# Patient Record
Sex: FEMALE | Race: WHITE | NOT HISPANIC OR LATINO | Employment: OTHER | URBAN - METROPOLITAN AREA
[De-identification: names, ages, dates, MRNs, and addresses within clinical notes are randomized per-mention and may not be internally consistent; named-entity substitution may affect disease eponyms.]

---

## 2017-11-09 ENCOUNTER — HOSPITAL ENCOUNTER (OUTPATIENT)
Dept: RADIOLOGY | Facility: MEDICAL CENTER | Age: 72
End: 2017-11-09
Attending: FAMILY MEDICINE
Payer: MEDICARE

## 2017-11-09 DIAGNOSIS — R22.42 MASS OF LOWER LEG, LEFT: ICD-10-CM

## 2017-11-09 PROCEDURE — 76882 US LMTD JT/FCL EVL NVASC XTR: CPT | Mod: LT

## 2018-02-23 ENCOUNTER — HOSPITAL ENCOUNTER (OUTPATIENT)
Dept: RADIOLOGY | Facility: MEDICAL CENTER | Age: 73
End: 2018-02-23
Attending: FAMILY MEDICINE
Payer: MEDICARE

## 2018-02-23 DIAGNOSIS — Z12.31 SCREENING MAMMOGRAM, ENCOUNTER FOR: ICD-10-CM

## 2018-02-23 PROCEDURE — 77063 BREAST TOMOSYNTHESIS BI: CPT

## 2018-03-07 ENCOUNTER — HOSPITAL ENCOUNTER (OUTPATIENT)
Dept: RADIOLOGY | Facility: MEDICAL CENTER | Age: 73
End: 2018-03-07
Attending: FAMILY MEDICINE
Payer: MEDICARE

## 2018-03-07 DIAGNOSIS — M85.9 DISORDER OF BONE DENSITY AND STRUCTURE, UNSPECIFIED: ICD-10-CM

## 2018-03-07 DIAGNOSIS — Z13.820 SCREENING FOR OSTEOPOROSIS: ICD-10-CM

## 2018-03-07 PROCEDURE — 77080 DXA BONE DENSITY AXIAL: CPT

## 2018-03-08 ENCOUNTER — HOSPITAL ENCOUNTER (OUTPATIENT)
Dept: LAB | Facility: MEDICAL CENTER | Age: 73
End: 2018-03-08
Attending: FAMILY MEDICINE
Payer: MEDICARE

## 2018-03-08 LAB
ALBUMIN SERPL BCP-MCNC: 4.2 G/DL (ref 3.2–4.9)
ALBUMIN/GLOB SERPL: 1.5 G/DL
ALP SERPL-CCNC: 52 U/L (ref 30–99)
ALT SERPL-CCNC: 25 U/L (ref 2–50)
ANION GAP SERPL CALC-SCNC: 6 MMOL/L (ref 0–11.9)
AST SERPL-CCNC: 24 U/L (ref 12–45)
BASOPHILS # BLD AUTO: 0.6 % (ref 0–1.8)
BASOPHILS # BLD: 0.03 K/UL (ref 0–0.12)
BILIRUB SERPL-MCNC: 0.5 MG/DL (ref 0.1–1.5)
BUN SERPL-MCNC: 17 MG/DL (ref 8–22)
CALCIUM SERPL-MCNC: 9.3 MG/DL (ref 8.5–10.5)
CHLORIDE SERPL-SCNC: 106 MMOL/L (ref 96–112)
CHOLEST SERPL-MCNC: 186 MG/DL (ref 100–199)
CO2 SERPL-SCNC: 28 MMOL/L (ref 20–33)
CREAT SERPL-MCNC: 0.76 MG/DL (ref 0.5–1.4)
EOSINOPHIL # BLD AUTO: 0.07 K/UL (ref 0–0.51)
EOSINOPHIL NFR BLD: 1.5 % (ref 0–6.9)
ERYTHROCYTE [DISTWIDTH] IN BLOOD BY AUTOMATED COUNT: 46.8 FL (ref 35.9–50)
GLOBULIN SER CALC-MCNC: 2.8 G/DL (ref 1.9–3.5)
GLUCOSE SERPL-MCNC: 91 MG/DL (ref 65–99)
HCT VFR BLD AUTO: 43.5 % (ref 37–47)
HDLC SERPL-MCNC: 66 MG/DL
HGB BLD-MCNC: 14.1 G/DL (ref 12–16)
IMM GRANULOCYTES # BLD AUTO: 0.01 K/UL (ref 0–0.11)
IMM GRANULOCYTES NFR BLD AUTO: 0.2 % (ref 0–0.9)
LDLC SERPL CALC-MCNC: 101 MG/DL
LYMPHOCYTES # BLD AUTO: 1.59 K/UL (ref 1–4.8)
LYMPHOCYTES NFR BLD: 33.7 % (ref 22–41)
MCH RBC QN AUTO: 32.4 PG (ref 27–33)
MCHC RBC AUTO-ENTMCNC: 32.4 G/DL (ref 33.6–35)
MCV RBC AUTO: 100 FL (ref 81.4–97.8)
MONOCYTES # BLD AUTO: 0.52 K/UL (ref 0–0.85)
MONOCYTES NFR BLD AUTO: 11 % (ref 0–13.4)
NEUTROPHILS # BLD AUTO: 2.5 K/UL (ref 2–7.15)
NEUTROPHILS NFR BLD: 53 % (ref 44–72)
NRBC # BLD AUTO: 0 K/UL
NRBC BLD-RTO: 0 /100 WBC
PLATELET # BLD AUTO: 266 K/UL (ref 164–446)
PMV BLD AUTO: 10.7 FL (ref 9–12.9)
POTASSIUM SERPL-SCNC: 4.4 MMOL/L (ref 3.6–5.5)
PROT SERPL-MCNC: 7 G/DL (ref 6–8.2)
RBC # BLD AUTO: 4.35 M/UL (ref 4.2–5.4)
SODIUM SERPL-SCNC: 140 MMOL/L (ref 135–145)
T4 SERPL-MCNC: 6.6 UG/DL (ref 4–12)
THYROPEROXIDASE AB SERPL-ACNC: 158 IU/ML (ref 0–9)
TRIGL SERPL-MCNC: 96 MG/DL (ref 0–149)
TSH SERPL DL<=0.005 MIU/L-ACNC: 1.29 UIU/ML (ref 0.38–5.33)
WBC # BLD AUTO: 4.7 K/UL (ref 4.8–10.8)

## 2018-03-08 PROCEDURE — 80053 COMPREHEN METABOLIC PANEL: CPT

## 2018-03-08 PROCEDURE — 85025 COMPLETE CBC W/AUTO DIFF WBC: CPT

## 2018-03-08 PROCEDURE — 80061 LIPID PANEL: CPT

## 2018-03-08 PROCEDURE — 86376 MICROSOMAL ANTIBODY EACH: CPT

## 2018-03-08 PROCEDURE — 84443 ASSAY THYROID STIM HORMONE: CPT

## 2018-03-08 PROCEDURE — 36415 COLL VENOUS BLD VENIPUNCTURE: CPT

## 2018-03-08 PROCEDURE — 84436 ASSAY OF TOTAL THYROXINE: CPT

## 2018-03-20 ENCOUNTER — HOSPITAL ENCOUNTER (OUTPATIENT)
Dept: RADIOLOGY | Facility: MEDICAL CENTER | Age: 73
End: 2018-03-20

## 2018-04-16 ENCOUNTER — HOSPITAL ENCOUNTER (OUTPATIENT)
Dept: RADIOLOGY | Facility: MEDICAL CENTER | Age: 73
End: 2018-04-16
Attending: FAMILY MEDICINE
Payer: MEDICARE

## 2018-04-16 DIAGNOSIS — N63.15 BREAST LUMP ON RIGHT SIDE AT 3 O'CLOCK POSITION: ICD-10-CM

## 2018-04-16 PROCEDURE — 76642 ULTRASOUND BREAST LIMITED: CPT | Mod: RT

## 2018-05-03 ENCOUNTER — HOSPITAL ENCOUNTER (OUTPATIENT)
Dept: RADIOLOGY | Facility: MEDICAL CENTER | Age: 73
End: 2018-05-03
Attending: SURGERY
Payer: MEDICARE

## 2018-05-03 DIAGNOSIS — R22.9 LOCALIZED SUPERFICIAL SWELLING, MASS, OR LUMP: ICD-10-CM

## 2018-05-03 PROCEDURE — 76882 US LMTD JT/FCL EVL NVASC XTR: CPT | Mod: LT

## 2018-05-03 PROCEDURE — 76881 US COMPL JOINT R-T W/IMG: CPT

## 2019-02-25 ENCOUNTER — HOSPITAL ENCOUNTER (OUTPATIENT)
Dept: RADIOLOGY | Facility: MEDICAL CENTER | Age: 74
End: 2019-02-25
Attending: FAMILY MEDICINE
Payer: MEDICARE

## 2019-02-25 DIAGNOSIS — Z12.31 VISIT FOR SCREENING MAMMOGRAM: ICD-10-CM

## 2019-02-25 PROCEDURE — 77063 BREAST TOMOSYNTHESIS BI: CPT

## 2019-04-08 ENCOUNTER — HOSPITAL ENCOUNTER (OUTPATIENT)
Dept: LAB | Facility: MEDICAL CENTER | Age: 74
End: 2019-04-08
Attending: FAMILY MEDICINE
Payer: MEDICARE

## 2019-04-08 LAB
ALBUMIN SERPL BCP-MCNC: 4.2 G/DL (ref 3.2–4.9)
ALBUMIN/GLOB SERPL: 1.4 G/DL
ALP SERPL-CCNC: 56 U/L (ref 30–99)
ALT SERPL-CCNC: 19 U/L (ref 2–50)
ANION GAP SERPL CALC-SCNC: 4 MMOL/L (ref 0–11.9)
APPEARANCE UR: CLEAR
AST SERPL-CCNC: 20 U/L (ref 12–45)
BACTERIA #/AREA URNS HPF: NEGATIVE /HPF
BASOPHILS # BLD AUTO: 0.9 % (ref 0–1.8)
BASOPHILS # BLD: 0.05 K/UL (ref 0–0.12)
BILIRUB SERPL-MCNC: 0.4 MG/DL (ref 0.1–1.5)
BILIRUB UR QL STRIP.AUTO: NEGATIVE
BUN SERPL-MCNC: 18 MG/DL (ref 8–22)
CALCIUM SERPL-MCNC: 9.6 MG/DL (ref 8.5–10.5)
CHLORIDE SERPL-SCNC: 105 MMOL/L (ref 96–112)
CHOLEST SERPL-MCNC: 192 MG/DL (ref 100–199)
CO2 SERPL-SCNC: 29 MMOL/L (ref 20–33)
COLOR UR: YELLOW
CREAT SERPL-MCNC: 0.8 MG/DL (ref 0.5–1.4)
EOSINOPHIL # BLD AUTO: 0.1 K/UL (ref 0–0.51)
EOSINOPHIL NFR BLD: 1.9 % (ref 0–6.9)
EPI CELLS #/AREA URNS HPF: ABNORMAL /HPF
ERYTHROCYTE [DISTWIDTH] IN BLOOD BY AUTOMATED COUNT: 48.4 FL (ref 35.9–50)
GLOBULIN SER CALC-MCNC: 2.9 G/DL (ref 1.9–3.5)
GLUCOSE SERPL-MCNC: 106 MG/DL (ref 65–99)
GLUCOSE UR STRIP.AUTO-MCNC: NEGATIVE MG/DL
HCT VFR BLD AUTO: 45.2 % (ref 37–47)
HDLC SERPL-MCNC: 77 MG/DL
HGB BLD-MCNC: 14.7 G/DL (ref 12–16)
HYALINE CASTS #/AREA URNS LPF: ABNORMAL /LPF
IMM GRANULOCYTES # BLD AUTO: 0 K/UL (ref 0–0.11)
IMM GRANULOCYTES NFR BLD AUTO: 0 % (ref 0–0.9)
KETONES UR STRIP.AUTO-MCNC: NEGATIVE MG/DL
LDLC SERPL CALC-MCNC: 103 MG/DL
LEUKOCYTE ESTERASE UR QL STRIP.AUTO: ABNORMAL
LYMPHOCYTES # BLD AUTO: 2.15 K/UL (ref 1–4.8)
LYMPHOCYTES NFR BLD: 40 % (ref 22–41)
MCH RBC QN AUTO: 33.6 PG (ref 27–33)
MCHC RBC AUTO-ENTMCNC: 32.5 G/DL (ref 33.6–35)
MCV RBC AUTO: 103.2 FL (ref 81.4–97.8)
MICRO URNS: ABNORMAL
MONOCYTES # BLD AUTO: 0.58 K/UL (ref 0–0.85)
MONOCYTES NFR BLD AUTO: 10.8 % (ref 0–13.4)
NEUTROPHILS # BLD AUTO: 2.5 K/UL (ref 2–7.15)
NEUTROPHILS NFR BLD: 46.4 % (ref 44–72)
NITRITE UR QL STRIP.AUTO: NEGATIVE
NRBC # BLD AUTO: 0 K/UL
NRBC BLD-RTO: 0 /100 WBC
PH UR STRIP.AUTO: 6.5 [PH]
PLATELET # BLD AUTO: 267 K/UL (ref 164–446)
PMV BLD AUTO: 10.4 FL (ref 9–12.9)
POTASSIUM SERPL-SCNC: 4.1 MMOL/L (ref 3.6–5.5)
PROT SERPL-MCNC: 7.1 G/DL (ref 6–8.2)
PROT UR QL STRIP: NEGATIVE MG/DL
RBC # BLD AUTO: 4.38 M/UL (ref 4.2–5.4)
RBC # URNS HPF: ABNORMAL /HPF
RBC UR QL AUTO: NEGATIVE
SODIUM SERPL-SCNC: 138 MMOL/L (ref 135–145)
SP GR UR STRIP.AUTO: 1.01
T3FREE SERPL-MCNC: 3.43 PG/ML (ref 2.4–4.2)
T4 FREE SERPL-MCNC: 1.13 NG/DL (ref 0.53–1.43)
TRIGL SERPL-MCNC: 62 MG/DL (ref 0–149)
TSH SERPL DL<=0.005 MIU/L-ACNC: 3.29 UIU/ML (ref 0.38–5.33)
UROBILINOGEN UR STRIP.AUTO-MCNC: 0.2 MG/DL
WBC # BLD AUTO: 5.4 K/UL (ref 4.8–10.8)
WBC #/AREA URNS HPF: ABNORMAL /HPF

## 2019-04-08 PROCEDURE — 85025 COMPLETE CBC W/AUTO DIFF WBC: CPT

## 2019-04-08 PROCEDURE — 81001 URINALYSIS AUTO W/SCOPE: CPT

## 2019-04-08 PROCEDURE — 84439 ASSAY OF FREE THYROXINE: CPT

## 2019-04-08 PROCEDURE — 87086 URINE CULTURE/COLONY COUNT: CPT

## 2019-04-08 PROCEDURE — 80061 LIPID PANEL: CPT

## 2019-04-08 PROCEDURE — 84443 ASSAY THYROID STIM HORMONE: CPT

## 2019-04-08 PROCEDURE — 36415 COLL VENOUS BLD VENIPUNCTURE: CPT

## 2019-04-08 PROCEDURE — 80053 COMPREHEN METABOLIC PANEL: CPT

## 2019-04-08 PROCEDURE — 84481 FREE ASSAY (FT-3): CPT

## 2019-04-10 LAB
BACTERIA UR CULT: NORMAL
SIGNIFICANT IND 70042: NORMAL
SITE SITE: NORMAL
SOURCE SOURCE: NORMAL

## 2019-05-15 ENCOUNTER — HOSPITAL ENCOUNTER (OUTPATIENT)
Dept: LAB | Facility: MEDICAL CENTER | Age: 74
End: 2019-05-15
Attending: FAMILY MEDICINE
Payer: MEDICARE

## 2019-05-15 LAB
BASOPHILS # BLD AUTO: 0.7 % (ref 0–1.8)
BASOPHILS # BLD: 0.04 K/UL (ref 0–0.12)
EOSINOPHIL # BLD AUTO: 0.23 K/UL (ref 0–0.51)
EOSINOPHIL NFR BLD: 4.1 % (ref 0–6.9)
ERYTHROCYTE [DISTWIDTH] IN BLOOD BY AUTOMATED COUNT: 45.1 FL (ref 35.9–50)
FOLATE SERPL-MCNC: 17.7 NG/ML
HCT VFR BLD AUTO: 45.1 % (ref 37–47)
HGB BLD-MCNC: 15.1 G/DL (ref 12–16)
HGB RETIC QN AUTO: 36 PG/CELL (ref 29–35)
IMM GRANULOCYTES # BLD AUTO: 0.01 K/UL (ref 0–0.11)
IMM GRANULOCYTES NFR BLD AUTO: 0.2 % (ref 0–0.9)
IMM RETICS NFR: 10.6 % (ref 9.3–17.4)
LYMPHOCYTES # BLD AUTO: 1.74 K/UL (ref 1–4.8)
LYMPHOCYTES NFR BLD: 31.2 % (ref 22–41)
MCH RBC QN AUTO: 33.6 PG (ref 27–33)
MCHC RBC AUTO-ENTMCNC: 33.5 G/DL (ref 33.6–35)
MCV RBC AUTO: 100.4 FL (ref 81.4–97.8)
MONOCYTES # BLD AUTO: 0.56 K/UL (ref 0–0.85)
MONOCYTES NFR BLD AUTO: 10.1 % (ref 0–13.4)
NEUTROPHILS # BLD AUTO: 2.99 K/UL (ref 2–7.15)
NEUTROPHILS NFR BLD: 53.7 % (ref 44–72)
NRBC # BLD AUTO: 0 K/UL
NRBC BLD-RTO: 0 /100 WBC
PLATELET # BLD AUTO: 266 K/UL (ref 164–446)
PMV BLD AUTO: 11 FL (ref 9–12.9)
RBC # BLD AUTO: 4.49 M/UL (ref 4.2–5.4)
RETICS # AUTO: 0.05 M/UL (ref 0.04–0.06)
RETICS/RBC NFR: 1.1 % (ref 0.8–2.1)
VIT B12 SERPL-MCNC: 332 PG/ML (ref 211–911)
WBC # BLD AUTO: 5.6 K/UL (ref 4.8–10.8)

## 2019-05-15 PROCEDURE — 84160 ASSAY OF PROTEIN ANY SOURCE: CPT

## 2019-05-15 PROCEDURE — 85025 COMPLETE CBC W/AUTO DIFF WBC: CPT

## 2019-05-15 PROCEDURE — 82746 ASSAY OF FOLIC ACID SERUM: CPT

## 2019-05-15 PROCEDURE — 85305 CLOT INHIBIT PROT S TOTAL: CPT

## 2019-05-15 PROCEDURE — 82607 VITAMIN B-12: CPT

## 2019-05-15 PROCEDURE — 36415 COLL VENOUS BLD VENIPUNCTURE: CPT

## 2019-05-15 PROCEDURE — 84165 PROTEIN E-PHORESIS SERUM: CPT

## 2019-05-15 PROCEDURE — 85046 RETICYTE/HGB CONCENTRATE: CPT

## 2019-05-17 LAB — PROT S AG ACT/NOR PPP IA: 123 % (ref 63–126)

## 2019-06-01 LAB
ALBUMIN SERPL-MCNC: 4.39 G/DL (ref 3.75–5.01)
ALPHA1 GLOB SERPL ELPH-MCNC: 0.3 G/DL (ref 0.19–0.46)
ALPHA2 GLOB SERPL ELPH-MCNC: 0.77 G/DL (ref 0.48–1.05)
B-GLOBULIN SERPL ELPH-MCNC: 0.84 G/DL (ref 0.48–1.1)
EER PROT ELECT SER Q1092: NORMAL
GAMMA GLOB SERPL ELPH-MCNC: 0.91 G/DL (ref 0.62–1.51)
INTERPRETATION SERPL IFE-IMP: NORMAL
PROT SERPL-MCNC: 7.2 G/DL (ref 6–8.3)

## 2019-09-12 ENCOUNTER — HOSPITAL ENCOUNTER (OUTPATIENT)
Dept: LAB | Facility: MEDICAL CENTER | Age: 74
End: 2019-09-12
Attending: FAMILY MEDICINE
Payer: MEDICARE

## 2019-09-12 LAB
ALBUMIN SERPL BCP-MCNC: 4.6 G/DL (ref 3.2–4.9)
ALBUMIN/GLOB SERPL: 1.8 G/DL
ALP SERPL-CCNC: 53 U/L (ref 30–99)
ALT SERPL-CCNC: 20 U/L (ref 2–50)
ANION GAP SERPL CALC-SCNC: 9 MMOL/L (ref 0–11.9)
AST SERPL-CCNC: 20 U/L (ref 12–45)
BASOPHILS # BLD AUTO: 0.6 % (ref 0–1.8)
BASOPHILS # BLD: 0.04 K/UL (ref 0–0.12)
BILIRUB SERPL-MCNC: 0.4 MG/DL (ref 0.1–1.5)
BUN SERPL-MCNC: 19 MG/DL (ref 8–22)
CALCIUM SERPL-MCNC: 9.6 MG/DL (ref 8.5–10.5)
CHLORIDE SERPL-SCNC: 106 MMOL/L (ref 96–112)
CO2 SERPL-SCNC: 27 MMOL/L (ref 20–33)
CREAT SERPL-MCNC: 0.92 MG/DL (ref 0.5–1.4)
EOSINOPHIL # BLD AUTO: 0.09 K/UL (ref 0–0.51)
EOSINOPHIL NFR BLD: 1.4 % (ref 0–6.9)
ERYTHROCYTE [DISTWIDTH] IN BLOOD BY AUTOMATED COUNT: 47.3 FL (ref 35.9–50)
GLOBULIN SER CALC-MCNC: 2.6 G/DL (ref 1.9–3.5)
GLUCOSE SERPL-MCNC: 98 MG/DL (ref 65–99)
HCT VFR BLD AUTO: 48.6 % (ref 37–47)
HGB BLD-MCNC: 15.2 G/DL (ref 12–16)
IMM GRANULOCYTES # BLD AUTO: 0.02 K/UL (ref 0–0.11)
IMM GRANULOCYTES NFR BLD AUTO: 0.3 % (ref 0–0.9)
LYMPHOCYTES # BLD AUTO: 1.64 K/UL (ref 1–4.8)
LYMPHOCYTES NFR BLD: 25.6 % (ref 22–41)
MCH RBC QN AUTO: 32.3 PG (ref 27–33)
MCHC RBC AUTO-ENTMCNC: 31.3 G/DL (ref 33.6–35)
MCV RBC AUTO: 103.2 FL (ref 81.4–97.8)
MONOCYTES # BLD AUTO: 0.56 K/UL (ref 0–0.85)
MONOCYTES NFR BLD AUTO: 8.8 % (ref 0–13.4)
NEUTROPHILS # BLD AUTO: 4.05 K/UL (ref 2–7.15)
NEUTROPHILS NFR BLD: 63.3 % (ref 44–72)
NRBC # BLD AUTO: 0 K/UL
NRBC BLD-RTO: 0 /100 WBC
PLATELET # BLD AUTO: 274 K/UL (ref 164–446)
PMV BLD AUTO: 11.3 FL (ref 9–12.9)
POTASSIUM SERPL-SCNC: 4.4 MMOL/L (ref 3.6–5.5)
PROT SERPL-MCNC: 7.2 G/DL (ref 6–8.2)
RBC # BLD AUTO: 4.71 M/UL (ref 4.2–5.4)
SODIUM SERPL-SCNC: 142 MMOL/L (ref 135–145)
WBC # BLD AUTO: 6.4 K/UL (ref 4.8–10.8)

## 2019-09-12 PROCEDURE — 85025 COMPLETE CBC W/AUTO DIFF WBC: CPT

## 2019-09-12 PROCEDURE — 87522 HEPATITIS C REVRS TRNSCRPJ: CPT

## 2019-09-12 PROCEDURE — 36415 COLL VENOUS BLD VENIPUNCTURE: CPT

## 2019-09-12 PROCEDURE — 80053 COMPREHEN METABOLIC PANEL: CPT

## 2019-09-14 LAB
HCV RNA SERPL NAA+PROBE-ACNC: NOT DETECTED IU/ML
HCV RNA SERPL NAA+PROBE-LOG IU: NOT DETECTED LOG IU/ML
HCV RNA SERPL QL NAA+PROBE: NOT DETECTED

## 2019-10-03 ENCOUNTER — HOSPITAL ENCOUNTER (EMERGENCY)
Facility: MEDICAL CENTER | Age: 74
End: 2019-10-03
Attending: EMERGENCY MEDICINE
Payer: MEDICARE

## 2019-10-03 ENCOUNTER — APPOINTMENT (OUTPATIENT)
Dept: RADIOLOGY | Facility: MEDICAL CENTER | Age: 74
End: 2019-10-03
Attending: EMERGENCY MEDICINE
Payer: MEDICARE

## 2019-10-03 VITALS
HEIGHT: 64 IN | WEIGHT: 192.79 LBS | TEMPERATURE: 98 F | OXYGEN SATURATION: 98 % | RESPIRATION RATE: 16 BRPM | SYSTOLIC BLOOD PRESSURE: 153 MMHG | HEART RATE: 68 BPM | BODY MASS INDEX: 32.91 KG/M2 | DIASTOLIC BLOOD PRESSURE: 71 MMHG

## 2019-10-03 DIAGNOSIS — S82.891A CLOSED FRACTURE OF RIGHT ANKLE, INITIAL ENCOUNTER: ICD-10-CM

## 2019-10-03 PROCEDURE — 302875 HCHG BANDAGE ACE 4 OR 6""

## 2019-10-03 PROCEDURE — 29515 APPLICATION SHORT LEG SPLINT: CPT

## 2019-10-03 PROCEDURE — 73610 X-RAY EXAM OF ANKLE: CPT | Mod: RT

## 2019-10-03 PROCEDURE — 99285 EMERGENCY DEPT VISIT HI MDM: CPT

## 2019-10-03 PROCEDURE — A9270 NON-COVERED ITEM OR SERVICE: HCPCS | Performed by: EMERGENCY MEDICINE

## 2019-10-03 PROCEDURE — 700102 HCHG RX REV CODE 250 W/ 637 OVERRIDE(OP): Performed by: EMERGENCY MEDICINE

## 2019-10-03 RX ORDER — HYDROCODONE BITARTRATE AND ACETAMINOPHEN 5; 325 MG/1; MG/1
1 TABLET ORAL EVERY 6 HOURS PRN
Qty: 12 TAB | Refills: 0 | Status: SHIPPED | OUTPATIENT
Start: 2019-10-03 | End: 2019-10-06

## 2019-10-03 RX ORDER — IBUPROFEN 600 MG/1
600 TABLET ORAL ONCE
Status: COMPLETED | OUTPATIENT
Start: 2019-10-03 | End: 2019-10-03

## 2019-10-03 RX ADMIN — IBUPROFEN 600 MG: 600 TABLET ORAL at 13:37

## 2019-10-03 NOTE — ED TRIAGE NOTES
"Chief Complaint   Patient presents with   • Ankle Injury     right ankle, slip and fell     Pt reports that she slipped and fell twisting right ankle. Ice pack applied PTA. Pt also reports left knee pain.  CMS+.  /77   Pulse 68   Temp 36.1 °C (97 °F) (Temporal)   Resp 16   Ht 1.626 m (5' 4\")   SpO2 97%   Pt informed of wait times. Educated on triage process.  Asked to return to triage RN for any new or worsening of symptoms. Thanked for patience.        "

## 2019-10-03 NOTE — ED NOTES
Splint and crutches applied. Crutches provided to pt with verbal understanding and return demonstration.Discharge information reviewed in detail. Pt verbalized understanding of discharge instructions to follow up with PCP and to return to ER if condition worsens. Pt educated/expressed awareness of not driving or operating heavy machinery.   Patient educated no combining of alcohol with other sedating medications.   Patient educated on 1 new prescriptions.  to drive home.   Pt ambulated out of ER in a steady gait.

## 2019-10-03 NOTE — ED PROVIDER NOTES
"ED Provider Note      ER PROVIDER NOTE        CHIEF COMPLAINT  Chief Complaint   Patient presents with   • Ankle Injury     right ankle, slip and fell       HPI  Sophia Leayh is a 74 y.o. female who presents to the emergency department complaining of right ankle pain.  Patient reports that just prior to arrival [place], slipped on some \"slime\" and fell, twisting her ankle outward.  She was able to walk a short distance after this but with significant pain.  She denies any focal weakness numbness or tingling.  She denies any other leg pain or knee pain on the right, does state that she hit her knee on the left when she landed, although this pain is improving.  She denies any other injury    REVIEW OF SYSTEMS  Pertinent positives include ankle pain. Pertinent negatives include no weakness or numbness. See HPI for details. All other systems reviewed and are negative.    PAST MEDICAL HISTORY   has a past medical history of Thyroid disorder.    SOCIAL HISTORY  Social History     Tobacco Use   • Smoking status: Never Smoker   • Smokeless tobacco: Never Used   Substance Use Topics   • Alcohol use: Yes     Comment: occasional   • Drug use: No       SURGICAL HISTORY  patient denies any surgical history    CURRENT MEDICATIONS  Home Medications    **Home medications have not yet been reviewed for this encounter**         ALLERGIES  Allergies   Allergen Reactions   • Pcn [Penicillins] Hives and Swelling   • Sulfa Drugs        PHYSICAL EXAM  VITAL SIGNS: /77   Pulse 68   Temp 36.1 °C (97 °F) (Temporal)   Resp 16   Ht 1.626 m (5' 4\")   Wt 87.5 kg (192 lb 12.7 oz)   SpO2 97%   BMI 33.09 kg/m²   Pulse ox interpretation: I interpret this pulse ox as normal.    Constitutional: Alert.  In no apparent distress.  HENT: Normocephalic, Atraumatic, Bilateral external ears normal. Nose normal.   Eyes: Pupils are equal and reactive. Conjunctiva normal, non-icteric.   Heart: Regular rate and " rhythm, no murmurs.    Lungs: Clear to auscultation bilaterally.  Skin: Warm, Dry, No erythema, No rash.   Musculoskeletal: Tenderness with swelling over lateral malleolus on the right, some tenderness dorsally over the ankle as well, negative squeeze, no tenderness to knee or proximal lower leg on the right, distal capillary refill less than 2 seconds, distal sensation is intact to light touch, slight bruise noted to patella on the left, no tenderness and no other bony tenderness throughout the left knee and leg,  no tenderness or major deformities noted. No edema.  Neurologic: Alert, Grossly non-focal.   Psychiatric: Affect normal, Judgment normal, Mood normal, Appears appropriate and not intoxicated.     DIAGNOSTIC STUDIES / PROCEDURES        RADIOLOGY  DX-ANKLE 3+ VIEWS RIGHT   Final Result      Fracture of the tip of the lateral malleolus which is mildly distracted.        The radiologist's interpretation of all radiological studies have been reviewed by me.    COURSE & MEDICAL DECISION MAKING  Nursing notes, VS, PMSFHx reviewed in chart.    1:25 PM - Patient seen and examined at bedside. Patient will be treated with ibuprofen. Ordered for xray to evaluate her symptoms.     2:15 PM  Patient reevaluated, updated on results, plan for splint, crutches, declines additional pain medication        Decision Making:  This is a 74 y.o. female presented with ankle pain after a mechanical fall.  She does have a distal fibula fracture, she is splinted, given crutches, prescription for Norco as needed for pain.  We will follow-up with orthopedics.  There is no evidence of neurovascular compromise, no evidence of compartment syndrome.  And no findings to suggest other injury at this time    I reviewed prescription monitoring program for patient's narcotic use before prescribing a scheduled drug.The patient will not drink alcohol nor drive with prescribed medications. The patient will return for new or worsening symptoms and  is stable at the time of discharge.    The patient is referred to a primary physician for blood pressure management, diabetic screening, and for all other preventative health concerns.    In prescribing controlled substances to this patient, I certify that I have obtained and reviewed the medical history of Sophia Leahy. I have also made a good nikole effort to obtain applicable records from other providers who have treated the patient and records did not demonstrate any increased risk of substance abuse that would prevent me from prescribing controlled substances.     I have conducted a physical exam and documented it. I have reviewed Ms. Leahy’s prescription history as maintained by the [place] Prescription Monitoring Program.     I have assessed the patient’s risk for abuse, dependency, and addiction using the validated Opioid Risk Tool available at https://www.mdcalc.com/wwusej-hyqp-qnrc-ort-narcotic-abuse.     Given the above, I believe the benefits of controlled substance therapy outweigh the risks. The reasons for prescribing controlled substances include non-narcotic, oral analgesic alternatives have been inadequate for pain control. Accordingly, I have discussed the risk and benefits, treatment plan, and alternative therapies with the patient.         DISPOSITION:  Patient will be discharged home in stable condition.    FOLLOW UP:  Kike Lawler M.D.  [address] [facility] [address] [phone]      Call to schedule an appointment for next week      OUTPATIENT MEDICATIONS:  New Prescriptions    No medications on file         FINAL IMPRESSION  1. Closed fracture of right ankle, initial encounter         The note accurately reflects work and decisions made by me.  Florian Yao  10/3/2019  2:17 PM

## 2019-11-08 ENCOUNTER — OFFICE VISIT (OUTPATIENT)
Dept: DERMATOLOGY | Facility: IMAGING CENTER | Age: 74
End: 2019-11-08
Payer: MEDICARE

## 2019-11-08 VITALS — BODY MASS INDEX: 31.65 KG/M2 | TEMPERATURE: 97.7 F | WEIGHT: 190 LBS | HEIGHT: 65 IN

## 2019-11-08 DIAGNOSIS — L57.0 ACTINIC KERATOSES: ICD-10-CM

## 2019-11-08 DIAGNOSIS — Z85.828 HISTORY OF SKIN CANCER: ICD-10-CM

## 2019-11-08 DIAGNOSIS — Z12.83 SKIN CANCER SCREENING: ICD-10-CM

## 2019-11-08 DIAGNOSIS — L71.9 ROSACEA: ICD-10-CM

## 2019-11-08 DIAGNOSIS — L81.4 LENTIGINES: ICD-10-CM

## 2019-11-08 DIAGNOSIS — L82.1 SEBORRHEIC KERATOSES: ICD-10-CM

## 2019-11-08 DIAGNOSIS — D22.9 MULTIPLE NEVI: ICD-10-CM

## 2019-11-08 DIAGNOSIS — L73.8 SEBACEOUS HYPERPLASIA: ICD-10-CM

## 2019-11-08 PROCEDURE — 99203 OFFICE O/P NEW LOW 30 MIN: CPT | Mod: 25 | Performed by: NURSE PRACTITIONER

## 2019-11-08 RX ORDER — METRONIDAZOLE 7.5 MG/G
1 GEL TOPICAL DAILY
Qty: 1 TUBE | Refills: 3 | Status: SHIPPED | OUTPATIENT
Start: 2019-11-08 | End: 2021-06-03

## 2019-11-08 ASSESSMENT — ENCOUNTER SYMPTOMS
CHILLS: 0
DIAPHORESIS: 0
WEIGHT LOSS: 0
FEVER: 0

## 2019-11-08 NOTE — PROGRESS NOTES
Dermatology New Patient Visit    Chief Complaint   Patient presents with   • Establish Care     AHMET       Subjective:     HPI:   Sophia Leahy is a 74 y.o. female presenting for    [facility] care for skin exam  Last exam 2 years ago    History of skin cancer: Yes, Details: Right shin, 8-10 years ago, type unknown  History of precancers/actinic keratoses: Yes, Details: AK's  History of biopsies:Yes, Details: See above  History of blistering/severe sunburns:No  Family history of skin cancer: Mother type unknown  Family history of atypical moles:No    HPI/location: Left abdomen  Time present: 6 mos - 1 year  Painful lesion: No  Itching lesion: No  Enlarging lesion: Yes  Anything make it better or worse? No      Past Medical History:   Diagnosis Date   • Thyroid disorder        Current Outpatient Medications on File Prior to Visit   Medication Sig Dispense Refill   • levothyroxine (SYNTHROID) 50 MCG TABS Take 50 mcg by mouth Every morning on an empty stomach.     • pravastatin (PRAVACHOL) 20 MG TABS Take 20 mg by mouth every evening.     • Multiple Vitamins-Minerals (HAIR/SKIN/NAILS PO) Take 1 Tab by mouth every day.     • Multiple Vitamins-Minerals (MULTI FOR HER 50+ PO) Take 1 Tab by mouth every day.     • L-LYSINE HCL PO Take 1 Tab by mouth every day.     • Calcium Carbonate-Vit D-Min (CALCIUM 1200 PO) Take 1 Tab by mouth every day.       No current facility-administered medications on file prior to visit.        Allergies   Allergen Reactions   • Pcn [Penicillins] Hives and Swelling   • Sulfa Drugs        History reviewed. No pertinent family history.    Social History     Socioeconomic History   • Marital status:      Spouse name: Not on file   • Number of children: Not on file   • Years of education: Not on file   • Highest education level: Not on file   Occupational History   • Not on file   Social Needs   • Financial resource strain: Not on file   • Food insecurity:     Worry: Not on file      "Inability: Not on file   • Transportation needs:     Medical: Not on file     Non-medical: Not on file   Tobacco Use   • Smoking status: Never Smoker   • Smokeless tobacco: Never Used   Substance and Sexual Activity   • Alcohol use: Yes     Comment: occasional   • Drug use: No   • Sexual activity: Not on file   Lifestyle   • Physical activity:     Days per week: Not on file     Minutes per session: Not on file   • Stress: Not on file   Relationships   • Social connections:     Talks on phone: Not on file     Gets together: Not on file     Attends Yazdanism service: Not on file     Active member of club or organization: Not on file     Attends meetings of clubs or organizations: Not on file     Relationship status: Not on file   • Intimate partner violence:     Fear of current or ex partner: Not on file     Emotionally abused: Not on file     Physically abused: Not on file     Forced sexual activity: Not on file   Other Topics Concern   • Not on file   Social History Narrative   • Not on file       Review of Systems   Constitutional: Negative for chills, diaphoresis, fever, malaise/fatigue and weight loss.   Skin: Negative for itching and rash.        Objective:     A full mucocutaneous exam was completed including: scalp, hair, ears, face, eyelids, conjunctiva, lips, gums/tongue/oropharynx, neck, chest breasts, abdomen, back, left and right upper extremities (including hands/digits, but fingernails covered with polish), left and right lower extremities (including feet/toes, but toenails covered in polish), buttocks, including external genitalia with the following pertinent findings listed below. Remaining above-listed examined areas within normal limits / negative for rashes or lesions.    Temp 36.5 °C (97.7 °F)   Ht 1.638 m (5' 4.5\")   Wt 86.2 kg (190 lb)     Physical Exam   Constitutional: She is oriented to person, place, and time and well-developed, well-nourished, and in no distress. No distress.   HENT: "   Head: Normocephalic.       Right Ear: External ear normal.   Left Ear: External ear normal.   Mouth/Throat: Oropharynx is clear and moist.   Ill-defined erythematous gritty/scaly papules over the forehead nose cheek     Eyes: Conjunctivae are normal.   Neck: Neck supple.   Pulmonary/Chest: Effort normal.   Lymphadenopathy:     She has no cervical adenopathy.   Neurological: She is alert and oriented to person, place, and time.   Skin: Skin is warm and dry. No rash noted. No erythema.        Several tan skin-colored stuck-on waxy papules scattered on the trunk    Several scattered tan and light brown macules over trunk and extremities    Few light brown skin-colored macules scattered over the trunk >> extremities         Psychiatric: Mood and affect normal.   Vitals reviewed.      DATA: none applicable to review    Assessment and Plan:     1. Actinic keratoses to face  CRYOTHERAPY:  Risks (including, but not limited to: hypo or hyperpigmentation, redness, blister, blood blister, recurrence, need for further treatment, infection, scar) and benefits of cryotherapy discussed. Patient verbally agreed to proceed with treatment. 2 cryotherapy freeze thaw cycles of 10 seconds were applied to 5 lesions on forehead, nose, and cheeks with cryac. Patient tolerated procedure well. Aftercare instructions given.      2. Sebaceous hyperplasia  - Benign-appearing nature of lesions discussed. Advised to return to clinic for any new or concerning changes.  Discussed cosmetic removal and procedure if pt desires    3. Rosaceaerythrotelangiectatic    - educated patient about diagnosis, management options, and expectations of treatment  -sensitive skin care products  -rtc for no improvement or for worsening  - metronidazole (METROGEL) 0.75 % gel; Apply 1 Application to affected area(s) every day.  Dispense: 1 Tube; Refill: 3    4. Lentigines  - Benign-appearing nature of lesions discussed. Advised to return to clinic for any new or  concerning changes.      5. Seborrheic keratoses  - Benign-appearing nature of lesions discussed. Advised to return to clinic for any new or concerning changes.      6. Multiple nevi  - Benign-appearing nature of lesions discussed. Advised to return to clinic for any new or concerning changes.  - ABCDE's of melanoma discussed      7. History of skin cancer-unknown type  -pt states she did not need chemo or radiation.  Stable and no reoccurrence noted n exam. Skin education as below    8. Skin cancer screening  Skin cancer education  - discussed importance of sun protective clothing, eyewear  - discussed importance of daily use of broad spectrum sunscreen with SPF 30 or greater, as well as need for reapplication ~every 2 hours when exposed to UVR  - discussed importance of regular self-exams, ideally once per month, every 12 months exams in clinic  - ABCDE's of melanoma discussed  - patient to bring any new or concerning lesions to my attention        Followup: Return in about 1 year (around 11/8/2020) for AHMET or sooner for any concerns.    GENI Dawn.

## 2020-01-06 ENCOUNTER — OFFICE VISIT (OUTPATIENT)
Dept: CARDIOLOGY | Facility: MEDICAL CENTER | Age: 75
End: 2020-01-06
Payer: MEDICARE

## 2020-01-06 VITALS
BODY MASS INDEX: 33.49 KG/M2 | HEIGHT: 65 IN | HEART RATE: 72 BPM | SYSTOLIC BLOOD PRESSURE: 142 MMHG | OXYGEN SATURATION: 96 % | WEIGHT: 201 LBS | DIASTOLIC BLOOD PRESSURE: 80 MMHG

## 2020-01-06 DIAGNOSIS — E03.9 HYPOTHYROIDISM (ACQUIRED): ICD-10-CM

## 2020-01-06 DIAGNOSIS — Z91.89 OTHER SPECIFIED PERSONAL RISK FACTORS, NOT ELSEWHERE CLASSIFIED: ICD-10-CM

## 2020-01-06 DIAGNOSIS — R06.09 DYSPNEA ON EXERTION: ICD-10-CM

## 2020-01-06 DIAGNOSIS — E78.00 HYPERCHOLESTEREMIA: ICD-10-CM

## 2020-01-06 LAB — EKG IMPRESSION: NORMAL

## 2020-01-06 PROCEDURE — 99203 OFFICE O/P NEW LOW 30 MIN: CPT | Performed by: INTERNAL MEDICINE

## 2020-01-06 PROCEDURE — 93000 ELECTROCARDIOGRAM COMPLETE: CPT | Performed by: INTERNAL MEDICINE

## 2020-01-06 RX ORDER — LEVOTHYROXINE SODIUM 0.07 MG/1
75 TABLET ORAL
COMMUNITY

## 2020-01-06 ASSESSMENT — ENCOUNTER SYMPTOMS
CARDIOVASCULAR NEGATIVE: 1
RESPIRATORY NEGATIVE: 1
NEUROLOGICAL NEGATIVE: 1
GASTROINTESTINAL NEGATIVE: 1
CONSTITUTIONAL NEGATIVE: 1

## 2020-01-06 NOTE — PROGRESS NOTES
Chief Complaint   Patient presents with   • Dyslipidemia       Subjective:   Sophia Leahy is a 74 y.o. female who who was seen at the urging of her  for evaluation of cardiac status.  She has history of hyperlipidemia and hypothyroidism no history of diabetes, hypertension smoking or family history of premature coronary disease.  The patient is not very physically active and even less so after she fractured her ankle 3 months ago.  There is no history of exertional induced chest pain, palpitations or ankle edema.  She does get short of breath at times with activity.  Family history: Mother  at 91 father lived into his mid 80s.  Social history: , non smoker, not physically active    Past Medical History:   Diagnosis Date   • Thyroid disorder      Past Surgical History:   Procedure Laterality Date   • ABDOMINAL HYSTERECTOMY TOTAL     • LUMPECTOMY     • OTHER ORTHOPEDIC SURGERY Right      No family history on file.  Social History     Socioeconomic History   • Marital status:      Spouse name: Not on file   • Number of children: Not on file   • Years of education: Not on file   • Highest education level: Not on file   Occupational History   • Not on file   Social Needs   • Financial resource strain: Not on file   • Food insecurity:     Worry: Not on file     Inability: Not on file   • Transportation needs:     Medical: Not on file     Non-medical: Not on file   Tobacco Use   • Smoking status: Never Smoker   • Smokeless tobacco: Never Used   Substance and Sexual Activity   • Alcohol use: Yes     Comment: occasional   • Drug use: No   • Sexual activity: Not on file   Lifestyle   • Physical activity:     Days per week: Not on file     Minutes per session: Not on file   • Stress: Not on file   Relationships   • Social connections:     Talks on phone: Not on file     Gets together: Not on file     Attends Religion service: Not on file     Active member of club or organization: Not on file      "Attends meetings of clubs or organizations: Not on file     Relationship status: Not on file   • Intimate partner violence:     Fear of current or ex partner: Not on file     Emotionally abused: Not on file     Physically abused: Not on file     Forced sexual activity: Not on file   Other Topics Concern   • Not on file   Social History Narrative   • Not on file     Allergies   Allergen Reactions   • Pcn [Penicillins] Hives and Swelling   • Sulfa Drugs      Outpatient Encounter Medications as of 1/6/2020   Medication Sig Dispense Refill   • levothyroxine (SYNTHROID) 75 MCG Tab Take 75 mcg by mouth every 48 hours.     • levothyroxine (SYNTHROID) 50 MCG TABS Take 50 mcg by mouth every 48 hours.     • pravastatin (PRAVACHOL) 20 MG TABS Take 20 mg by mouth every evening.     • Multiple Vitamins-Minerals (HAIR/SKIN/NAILS PO) Take 1 Tab by mouth every day.     • L-LYSINE HCL PO Take 1 Tab by mouth every day.     • metronidazole (METROGEL) 0.75 % gel Apply 1 Application to affected area(s) every day. (Patient not taking: Reported on 1/6/2020) 1 Tube 3   • Calcium Carbonate-Vit D-Min (CALCIUM 1200 PO) Take 1 Tab by mouth every day.     • Multiple Vitamins-Minerals (MULTI FOR HER 50+ PO) Take 1 Tab by mouth every day.       No facility-administered encounter medications on file as of 1/6/2020.      Review of Systems   Constitutional: Negative.    HENT: Negative.    Respiratory: Negative.    Cardiovascular: Negative.    Gastrointestinal: Negative.    Musculoskeletal: Positive for joint pain.   Skin: Negative.    Neurological: Negative.    Endo/Heme/Allergies: Negative.         Objective:   /80 (BP Location: Left arm, Patient Position: Sitting, BP Cuff Size: Adult)   Pulse 72   Ht 1.638 m (5' 4.5\")   Wt 91.2 kg (201 lb)   SpO2 96%   BMI 33.97 kg/m²     Physical Exam   Constitutional: She is oriented to person, place, and time. No distress.   HENT:   Head: Normocephalic and atraumatic.   Eyes: Pupils are equal, round, " and reactive to light. No scleral icterus.   Neck: No JVD present.   Cardiovascular: Normal rate and regular rhythm.   No murmur heard.  Pulmonary/Chest: No respiratory distress. She has no wheezes.   Abdominal: Soft. She exhibits no distension. There is no tenderness.   Musculoskeletal:         General: No edema.   Neurological: She is alert and oriented to person, place, and time.   Skin: Skin is warm.   Psychiatric: She has a normal mood and affect.       Assessment:     1. Hypercholesteremia  EKG   2. Dyspnea on exertion  EC-ECHOCARDIOGRAM COMPLETE W/O CONT   3. Hypothyroidism (acquired)     4. Other specified personal risk factors, not elsewhere classified  CT-CARDIAC SCORING       Medical Decision Making:  Today's Assessment / Status / Plan:   EKG: My interpretation EKG demonstrates sinus rhythm at 65/min there is inverted T wave in V1 and V2 that can be normal variant.  Minor ST segment changes in aVL.  Borderline EKG.    Dyspnea on exertion: Etiology is uncertain.  Recommend an echo to evaluate her LV function and RV pressures.  There is no history of overt snoring or sleep apnea.    Evaluation of cardiac risk factors: In the face of a normal EKG and no chest pain a treadmill is unlikely to be of benefit.  Recommend to have a coronary calcification study to help determine her coronary risk.    History of hyperlipidemia: Treated with a statin.  Should be notified of the testing results.  Return as needed

## 2020-01-09 ENCOUNTER — HOSPITAL ENCOUNTER (OUTPATIENT)
Dept: LAB | Facility: MEDICAL CENTER | Age: 75
End: 2020-01-09
Attending: INTERNAL MEDICINE
Payer: MEDICARE

## 2020-01-09 ENCOUNTER — HOSPITAL ENCOUNTER (OUTPATIENT)
Dept: LAB | Facility: MEDICAL CENTER | Age: 75
End: 2020-01-09
Attending: FAMILY MEDICINE
Payer: MEDICARE

## 2020-01-09 LAB
ALBUMIN SERPL BCP-MCNC: 4.1 G/DL (ref 3.2–4.9)
ALBUMIN/GLOB SERPL: 1.6 G/DL
ALP SERPL-CCNC: 54 U/L (ref 30–99)
ALT SERPL-CCNC: 29 U/L (ref 2–50)
ANION GAP SERPL CALC-SCNC: 7 MMOL/L (ref 0–11.9)
AST SERPL-CCNC: 23 U/L (ref 12–45)
BASOPHILS # BLD AUTO: 0.6 % (ref 0–1.8)
BASOPHILS # BLD: 0.03 K/UL (ref 0–0.12)
BILIRUB SERPL-MCNC: 0.6 MG/DL (ref 0.1–1.5)
BUN SERPL-MCNC: 24 MG/DL (ref 8–22)
CALCIUM SERPL-MCNC: 9 MG/DL (ref 8.5–10.5)
CHLORIDE SERPL-SCNC: 105 MMOL/L (ref 96–112)
CHOLEST SERPL-MCNC: 192 MG/DL (ref 100–199)
CO2 SERPL-SCNC: 27 MMOL/L (ref 20–33)
CREAT SERPL-MCNC: 0.75 MG/DL (ref 0.5–1.4)
EOSINOPHIL # BLD AUTO: 0.09 K/UL (ref 0–0.51)
EOSINOPHIL NFR BLD: 1.9 % (ref 0–6.9)
ERYTHROCYTE [DISTWIDTH] IN BLOOD BY AUTOMATED COUNT: 48.6 FL (ref 35.9–50)
FASTING STATUS PATIENT QL REPORTED: NORMAL
GLOBULIN SER CALC-MCNC: 2.6 G/DL (ref 1.9–3.5)
GLUCOSE SERPL-MCNC: 80 MG/DL (ref 65–99)
HCT VFR BLD AUTO: 44.7 % (ref 37–47)
HDLC SERPL-MCNC: 71 MG/DL
HGB BLD-MCNC: 14.5 G/DL (ref 12–16)
HGB RETIC QN AUTO: 36 PG/CELL (ref 29–35)
IMM GRANULOCYTES # BLD AUTO: 0.02 K/UL (ref 0–0.11)
IMM GRANULOCYTES NFR BLD AUTO: 0.4 % (ref 0–0.9)
IMM RETICS NFR: 12.2 % (ref 9.3–17.4)
LDLC SERPL CALC-MCNC: 110 MG/DL
LYMPHOCYTES # BLD AUTO: 1.56 K/UL (ref 1–4.8)
LYMPHOCYTES NFR BLD: 33.7 % (ref 22–41)
MCH RBC QN AUTO: 33.5 PG (ref 27–33)
MCHC RBC AUTO-ENTMCNC: 32.4 G/DL (ref 33.6–35)
MCV RBC AUTO: 103.2 FL (ref 81.4–97.8)
MONOCYTES # BLD AUTO: 0.45 K/UL (ref 0–0.85)
MONOCYTES NFR BLD AUTO: 9.7 % (ref 0–13.4)
NEUTROPHILS # BLD AUTO: 2.48 K/UL (ref 2–7.15)
NEUTROPHILS NFR BLD: 53.7 % (ref 44–72)
NRBC # BLD AUTO: 0 K/UL
NRBC BLD-RTO: 0 /100 WBC
PLATELET # BLD AUTO: 283 K/UL (ref 164–446)
PMV BLD AUTO: 10.6 FL (ref 9–12.9)
POTASSIUM SERPL-SCNC: 3.9 MMOL/L (ref 3.6–5.5)
PROT SERPL-MCNC: 6.7 G/DL (ref 6–8.2)
RBC # BLD AUTO: 4.33 M/UL (ref 4.2–5.4)
RETICS # AUTO: 0.07 M/UL (ref 0.04–0.06)
RETICS/RBC NFR: 1.8 % (ref 0.8–2.1)
SODIUM SERPL-SCNC: 139 MMOL/L (ref 135–145)
TRIGL SERPL-MCNC: 57 MG/DL (ref 0–149)
WBC # BLD AUTO: 4.6 K/UL (ref 4.8–10.8)

## 2020-01-09 PROCEDURE — 82746 ASSAY OF FOLIC ACID SERUM: CPT

## 2020-01-09 PROCEDURE — 84443 ASSAY THYROID STIM HORMONE: CPT

## 2020-01-09 PROCEDURE — 36415 COLL VENOUS BLD VENIPUNCTURE: CPT

## 2020-01-09 PROCEDURE — 80053 COMPREHEN METABOLIC PANEL: CPT

## 2020-01-09 PROCEDURE — 80061 LIPID PANEL: CPT

## 2020-01-09 PROCEDURE — 85046 RETICYTE/HGB CONCENTRATE: CPT

## 2020-01-09 PROCEDURE — 82607 VITAMIN B-12: CPT

## 2020-01-09 PROCEDURE — 85025 COMPLETE CBC W/AUTO DIFF WBC: CPT

## 2020-01-10 LAB
FOLATE SERPL-MCNC: 20.7 NG/ML
TSH SERPL DL<=0.005 MIU/L-ACNC: 1.67 UIU/ML (ref 0.38–5.33)
VIT B12 SERPL-MCNC: 1018 PG/ML (ref 211–911)

## 2020-01-13 ENCOUNTER — HOSPITAL ENCOUNTER (OUTPATIENT)
Dept: CARDIOLOGY | Facility: MEDICAL CENTER | Age: 75
End: 2020-01-13
Attending: INTERNAL MEDICINE
Payer: MEDICARE

## 2020-01-13 DIAGNOSIS — R06.09 DYSPNEA ON EXERTION: ICD-10-CM

## 2020-01-13 PROCEDURE — 93306 TTE W/DOPPLER COMPLETE: CPT

## 2020-01-14 LAB
LV EJECT FRACT  99904: 75
LV EJECT FRACT MOD 2C 99903: 53.53
LV EJECT FRACT MOD 4C 99902: 77.83
LV EJECT FRACT MOD BP 99901: 75.55

## 2020-01-14 PROCEDURE — 93306 TTE W/DOPPLER COMPLETE: CPT | Mod: 26 | Performed by: INTERNAL MEDICINE

## 2020-01-20 ENCOUNTER — HOSPITAL ENCOUNTER (OUTPATIENT)
Dept: RADIOLOGY | Facility: MEDICAL CENTER | Age: 75
End: 2020-01-20
Attending: INTERNAL MEDICINE
Payer: COMMERCIAL

## 2020-01-20 DIAGNOSIS — Z91.89 OTHER SPECIFIED PERSONAL RISK FACTORS, NOT ELSEWHERE CLASSIFIED: ICD-10-CM

## 2020-01-20 PROCEDURE — 4410556 CT-CARDIAC SCORING

## 2020-01-23 ENCOUNTER — TELEPHONE (OUTPATIENT)
Dept: CARDIOLOGY | Facility: MEDICAL CENTER | Age: 75
End: 2020-01-23

## 2020-01-23 NOTE — TELEPHONE ENCOUNTER
----- Message from Javi Rosen M.D. sent at 1/23/2020  7:38 AM PST -----  CT calcification score result reveals calcium score 34 which is excellent.  This puts him in a low risk group.  The usefulness of statins in patients with scores of less than 100 is not entirely clear.  Recommend he continue his current medications.

## 2020-08-04 ENCOUNTER — HOSPITAL ENCOUNTER (OUTPATIENT)
Dept: LAB | Facility: MEDICAL CENTER | Age: 75
End: 2020-08-04
Attending: FAMILY MEDICINE
Payer: MEDICARE

## 2020-08-04 LAB
ALBUMIN SERPL BCP-MCNC: 4.3 G/DL (ref 3.2–4.9)
ALBUMIN/GLOB SERPL: 1.5 G/DL
ALP SERPL-CCNC: 67 U/L (ref 30–99)
ALT SERPL-CCNC: 16 U/L (ref 2–50)
ANION GAP SERPL CALC-SCNC: 13 MMOL/L (ref 7–16)
AST SERPL-CCNC: 21 U/L (ref 12–45)
BASOPHILS # BLD AUTO: 0.6 % (ref 0–1.8)
BASOPHILS # BLD: 0.03 K/UL (ref 0–0.12)
BILIRUB SERPL-MCNC: 0.4 MG/DL (ref 0.1–1.5)
BUN SERPL-MCNC: 17 MG/DL (ref 8–22)
CALCIUM SERPL-MCNC: 9.6 MG/DL (ref 8.4–10.2)
CHLORIDE SERPL-SCNC: 106 MMOL/L (ref 96–112)
CHOLEST SERPL-MCNC: 170 MG/DL (ref 100–199)
CO2 SERPL-SCNC: 25 MMOL/L (ref 20–33)
CREAT SERPL-MCNC: 0.77 MG/DL (ref 0.5–1.4)
EOSINOPHIL # BLD AUTO: 0.13 K/UL (ref 0–0.51)
EOSINOPHIL NFR BLD: 2.5 % (ref 0–6.9)
ERYTHROCYTE [DISTWIDTH] IN BLOOD BY AUTOMATED COUNT: 48.2 FL (ref 35.9–50)
FASTING STATUS PATIENT QL REPORTED: NORMAL
GLOBULIN SER CALC-MCNC: 2.9 G/DL (ref 1.9–3.5)
GLUCOSE SERPL-MCNC: 109 MG/DL (ref 65–99)
HCT VFR BLD AUTO: 46.2 % (ref 37–47)
HDLC SERPL-MCNC: 75 MG/DL
HGB BLD-MCNC: 15.2 G/DL (ref 12–16)
IMM GRANULOCYTES # BLD AUTO: 0.02 K/UL (ref 0–0.11)
IMM GRANULOCYTES NFR BLD AUTO: 0.4 % (ref 0–0.9)
LDLC SERPL CALC-MCNC: 85 MG/DL
LYMPHOCYTES # BLD AUTO: 1.83 K/UL (ref 1–4.8)
LYMPHOCYTES NFR BLD: 35.5 % (ref 22–41)
MCH RBC QN AUTO: 32.5 PG (ref 27–33)
MCHC RBC AUTO-ENTMCNC: 32.9 G/DL (ref 33.6–35)
MCV RBC AUTO: 98.9 FL (ref 81.4–97.8)
MONOCYTES # BLD AUTO: 0.63 K/UL (ref 0–0.85)
MONOCYTES NFR BLD AUTO: 12.2 % (ref 0–13.4)
NEUTROPHILS # BLD AUTO: 2.52 K/UL (ref 2–7.15)
NEUTROPHILS NFR BLD: 48.8 % (ref 44–72)
NRBC # BLD AUTO: 0 K/UL
NRBC BLD-RTO: 0 /100 WBC
PLATELET # BLD AUTO: 288 K/UL (ref 164–446)
PMV BLD AUTO: 10.3 FL (ref 9–12.9)
POTASSIUM SERPL-SCNC: 4.3 MMOL/L (ref 3.6–5.5)
PROT SERPL-MCNC: 7.2 G/DL (ref 6–8.2)
RBC # BLD AUTO: 4.67 M/UL (ref 4.2–5.4)
SODIUM SERPL-SCNC: 144 MMOL/L (ref 135–145)
TRIGL SERPL-MCNC: 48 MG/DL (ref 0–149)
TSH SERPL DL<=0.005 MIU/L-ACNC: 1.89 UIU/ML (ref 0.38–5.33)
WBC # BLD AUTO: 5.2 K/UL (ref 4.8–10.8)

## 2020-08-04 PROCEDURE — 80053 COMPREHEN METABOLIC PANEL: CPT

## 2020-08-04 PROCEDURE — 84443 ASSAY THYROID STIM HORMONE: CPT

## 2020-08-04 PROCEDURE — 80061 LIPID PANEL: CPT

## 2020-08-04 PROCEDURE — 85025 COMPLETE CBC W/AUTO DIFF WBC: CPT

## 2020-08-04 PROCEDURE — 36415 COLL VENOUS BLD VENIPUNCTURE: CPT

## 2020-09-18 ENCOUNTER — OFFICE VISIT (OUTPATIENT)
Dept: DERMATOLOGY | Facility: IMAGING CENTER | Age: 75
End: 2020-09-18
Payer: MEDICARE

## 2020-09-18 DIAGNOSIS — L57.0 ACTINIC KERATOSES: ICD-10-CM

## 2020-09-18 PROCEDURE — 17003 DESTRUCT PREMALG LES 2-14: CPT | Performed by: NURSE PRACTITIONER

## 2020-09-18 PROCEDURE — 17000 DESTRUCT PREMALG LESION: CPT | Performed by: NURSE PRACTITIONER

## 2020-09-18 ASSESSMENT — ENCOUNTER SYMPTOMS
FEVER: 0
WEIGHT LOSS: 0
CHILLS: 0
DIAPHORESIS: 0

## 2020-09-18 NOTE — PROGRESS NOTES
Dermatology Return Patient Visit    Chief Complaint   Patient presents with   • Follow-Up   • Skin Lesion       Subjective:     HPI:   Sophia Leahy is a 74 y.o. female presenting for    HPI: non healing skin lesion , raw   Location: nose  Time present: 11 months   Painful lesion: No  Itching lesion: No  Enlarging lesion: No  Anything make it better or worse?no    HPI: skin lesion   Location: right side of jaw   Time present: 6 months   Painful lesion: No  Itching lesion: No  Enlarging lesion: No  Anything make it better or worse?no     Last TSE11/2019       History of skin cancer: Yes, Details: Right shin, 8-10 years ago, type unknown  History of precancers/actinic keratoses: Yes, Details: AK's  History of biopsies:Yes, Details: See above  History of blistering/severe sunburns:No  Family history of skin cancer: Mother type unknown  Family history of atypical moles:No          Past Medical History:   Diagnosis Date   • Thyroid disorder        Current Outpatient Medications on File Prior to Visit   Medication Sig Dispense Refill   • levothyroxine (SYNTHROID) 75 MCG Tab Take 75 mcg by mouth every 48 hours.     • metronidazole (METROGEL) 0.75 % gel Apply 1 Application to affected area(s) every day. (Patient not taking: Reported on 1/6/2020) 1 Tube 3   • levothyroxine (SYNTHROID) 50 MCG TABS Take 50 mcg by mouth every 48 hours.     • pravastatin (PRAVACHOL) 20 MG TABS Take 20 mg by mouth every evening.     • Calcium Carbonate-Vit D-Min (CALCIUM 1200 PO) Take 1 Tab by mouth every day.     • Multiple Vitamins-Minerals (HAIR/SKIN/NAILS PO) Take 1 Tab by mouth every day.     • Multiple Vitamins-Minerals (MULTI FOR HER 50+ PO) Take 1 Tab by mouth every day.     • L-LYSINE HCL PO Take 1 Tab by mouth every day.       No current facility-administered medications on file prior to visit.        Allergies   Allergen Reactions   • Pcn [Penicillins] Hives and Swelling   • Sulfa Drugs        No family history on file.    Social  History     Socioeconomic History   • Marital status:      Spouse name: Not on file   • Number of children: Not on file   • Years of education: Not on file   • Highest education level: Not on file   Occupational History   • Not on file   Social Needs   • Financial resource strain: Not on file   • Food insecurity     Worry: Not on file     Inability: Not on file   • Transportation needs     Medical: Not on file     Non-medical: Not on file   Tobacco Use   • Smoking status: Never Smoker   • Smokeless tobacco: Never Used   Substance and Sexual Activity   • Alcohol use: Yes     Comment: occasional   • Drug use: No   • Sexual activity: Not on file   Lifestyle   • Physical activity     Days per week: Not on file     Minutes per session: Not on file   • Stress: Not on file   Relationships   • Social connections     Talks on phone: Not on file     Gets together: Not on file     Attends Christianity service: Not on file     Active member of club or organization: Not on file     Attends meetings of clubs or organizations: Not on file     Relationship status: Not on file   • Intimate partner violence     Fear of current or ex partner: Not on file     Emotionally abused: Not on file     Physically abused: Not on file     Forced sexual activity: Not on file   Other Topics Concern   • Not on file   Social History Narrative   • Not on file       Review of Systems   Constitutional: Negative for chills, diaphoresis, fever, malaise/fatigue and weight loss.   Skin: Negative for itching and rash.        Objective:     A full mucocutaneous exam was completed including: scalp, hair, ears, face, eyelids, conjunctiva, lips, gums/tongue/oropharynx, neck, chest breasts, abdomen, back, left and right upper extremities (including hands/digits, but fingernails covered with polish), left and right lower extremities (including feet/toes, but toenails covered in polish), buttocks, including external genitalia with the following pertinent  findings listed below. Remaining above-listed examined areas within normal limits / negative for rashes or lesions.    There were no vitals taken for this visit.    Physical Exam   Constitutional: She is oriented to person, place, and time and well-developed, well-nourished, and in no distress. No distress.   HENT:   Head: Normocephalic.       Right Ear: External ear normal.   Left Ear: External ear normal.   Mouth/Throat: Oropharynx is clear and moist.   Ill-defined erythematous gritty/scaly papules over the forehead nose cheek     Eyes: Conjunctivae are normal.   Neck: Neck supple.   Pulmonary/Chest: Effort normal.   Lymphadenopathy:     She has no cervical adenopathy.   Neurological: She is alert and oriented to person, place, and time.   Skin: Skin is warm and dry. No rash noted. No erythema.        Several tan skin-colored stuck-on waxy papules scattered on the trunk    Several scattered tan and light brown macules over trunk and extremities    Few light brown skin-colored macules scattered over the trunk >> extremities         Psychiatric: Mood and affect normal.   Vitals reviewed.      DATA: none applicable to review    Assessment and Plan:     1. Actinic keratoses to face  CRYOTHERAPY:  Risks (including, but not limited to: hypo or hyperpigmentation, redness, blister, blood blister, recurrence, need for further treatment, infection, scar) and benefits of cryotherapy discussed. Patient verbally agreed to proceed with treatment. 2 cryotherapy freeze thaw cycles of 10 seconds were applied to 5 lesions on forehead, nose, and cheeks with cryac. Patient tolerated procedure well. Aftercare instructions given.      2. Sebaceous hyperplasia  - Benign-appearing nature of lesions discussed. Advised to return to clinic for any new or concerning changes.  Discussed cosmetic removal and procedure if pt desires    3. Rosaceaerythrotelangiectatic    - educated patient about diagnosis, management options, and expectations of  treatment  -sensitive skin care products  -rtc for no improvement or for worsening  - metronidazole (METROGEL) 0.75 % gel; Apply 1 Application to affected area(s) every day.  Dispense: 1 Tube; Refill: 3    4. Lentigines  - Benign-appearing nature of lesions discussed. Advised to return to clinic for any new or concerning changes.      5. Seborrheic keratoses  - Benign-appearing nature of lesions discussed. Advised to return to clinic for any new or concerning changes.      6. Multiple nevi  - Benign-appearing nature of lesions discussed. Advised to return to clinic for any new or concerning changes.  - ABCDE's of melanoma discussed      7. History of skin cancer-unknown type  -pt states she did not need chemo or radiation.  Stable and no reoccurrence noted n exam. Skin education as below    8. Skin cancer screening  Skin cancer education  - discussed importance of sun protective clothing, eyewear  - discussed importance of daily use of broad spectrum sunscreen with SPF 30 or greater, as well as need for reapplication ~every 2 hours when exposed to UVR  - discussed importance of regular self-exams, ideally once per month, every 12 months exams in clinic  - ABCDE's of melanoma discussed  - patient to bring any new or concerning lesions to my attention        Followup: No follow-ups on file.    GENI Dawn.

## 2020-09-18 NOTE — PROGRESS NOTES
Dermatology Return Patient Visit    Chief Complaint   Patient presents with   • Follow-Up   • Skin Lesion       Subjective:     HPI:   Sophia Leahy is a 75 y.o. female presenting for    HPI: non healing skin lesion , raw   Location: nose  Time present: 11 months   Painful lesion: No  Itching lesion: No  Enlarging lesion: No  Anything make it better or worse?no    HPI: skin lesion   Location: right side of jaw   Time present: 6 months   Painful lesion: No  Itching lesion: No  Enlarging lesion: No  Anything make it better or worse?no     Last TSE11/2019       History of skin cancer: Yes, Details: Right shin, 8-10 years ago, type unknown  History of precancers/actinic keratoses: Yes, Details: AK's  History of biopsies:Yes, Details: See above  History of blistering/severe sunburns:No  Family history of skin cancer: Mother type unknown  Family history of atypical moles:No      Past Medical History:   Diagnosis Date   • Thyroid disorder        Current Outpatient Medications on File Prior to Visit   Medication Sig Dispense Refill   • levothyroxine (SYNTHROID) 75 MCG Tab Take 75 mcg by mouth every 48 hours.     • metronidazole (METROGEL) 0.75 % gel Apply 1 Application to affected area(s) every day. (Patient not taking: Reported on 1/6/2020) 1 Tube 3   • levothyroxine (SYNTHROID) 50 MCG TABS Take 50 mcg by mouth every 48 hours.     • pravastatin (PRAVACHOL) 20 MG TABS Take 20 mg by mouth every evening.     • Calcium Carbonate-Vit D-Min (CALCIUM 1200 PO) Take 1 Tab by mouth every day.     • Multiple Vitamins-Minerals (HAIR/SKIN/NAILS PO) Take 1 Tab by mouth every day.     • Multiple Vitamins-Minerals (MULTI FOR HER 50+ PO) Take 1 Tab by mouth every day.     • L-LYSINE HCL PO Take 1 Tab by mouth every day.       No current facility-administered medications on file prior to visit.        Allergies   Allergen Reactions   • Pcn [Penicillins] Hives and Swelling   • Sulfa Drugs        History reviewed. No pertinent family  history.    Social History     Socioeconomic History   • Marital status:      Spouse name: Not on file   • Number of children: Not on file   • Years of education: Not on file   • Highest education level: Not on file   Occupational History   • Not on file   Social Needs   • Financial resource strain: Not on file   • Food insecurity     Worry: Not on file     Inability: Not on file   • Transportation needs     Medical: Not on file     Non-medical: Not on file   Tobacco Use   • Smoking status: Never Smoker   • Smokeless tobacco: Never Used   Substance and Sexual Activity   • Alcohol use: Yes     Comment: occasional   • Drug use: No   • Sexual activity: Not on file   Lifestyle   • Physical activity     Days per week: Not on file     Minutes per session: Not on file   • Stress: Not on file   Relationships   • Social connections     Talks on phone: Not on file     Gets together: Not on file     Attends Church service: Not on file     Active member of club or organization: Not on file     Attends meetings of clubs or organizations: Not on file     Relationship status: Not on file   • Intimate partner violence     Fear of current or ex partner: Not on file     Emotionally abused: Not on file     Physically abused: Not on file     Forced sexual activity: Not on file   Other Topics Concern   • Not on file   Social History Narrative   • Not on file       Review of Systems   Constitutional: Negative for chills and fever.   Skin: Negative for itching and rash.        Objective:     A focused cutaneous exam was completed including: ears, face, eyelids, lips and neck with the following pertinent findings listed below. Remaining above-listed examined areas within normal limits / negative for rashes or lesions.    There were no vitals taken for this visit.    Physical Exam   Constitutional: She is oriented to person, place, and time and well-developed, well-nourished, and in no distress. No distress.   HENT:   Head:  Normocephalic.       Pulmonary/Chest: Effort normal.   Neurological: She is alert and oriented to person, place, and time.   Skin: Skin is warm and dry. No rash noted. There is erythema.   Psychiatric: Mood normal.       DATA: none applicable to review    Assessment and Plan:     1. Actinic keratoses  CRYOTHERAPY:  Risks (including, but not limited to: hypo or hyperpigmentation, redness, blister, blood blister, recurrence, need for further treatment, infection, scar) and benefits of cryotherapy discussed. Patient verbally agreed to proceed with treatment. 2 cryotherapy freeze thaw cycles of 10 seconds were applied to 3 lesions on face  with cryac. Patient tolerated procedure well. Aftercare instructions given.          Followup: Return for November for AHMET or sooner for any concerns.    GENI Dawn.

## 2021-01-14 DIAGNOSIS — Z23 NEED FOR VACCINATION: ICD-10-CM

## 2021-02-24 DIAGNOSIS — Z00.6 RESEARCH STUDY PATIENT: ICD-10-CM

## 2021-02-25 ENCOUNTER — HOSPITAL ENCOUNTER (OUTPATIENT)
Facility: MEDICAL CENTER | Age: 76
End: 2021-02-25
Attending: PATHOLOGY
Payer: COMMERCIAL

## 2021-02-26 DIAGNOSIS — Z00.6 RESEARCH STUDY PATIENT: ICD-10-CM

## 2021-03-07 LAB
ELF SCORE: 9.5
RELATIVE RISK: NORMAL
RISK GROUP: NORMAL
RISK: 3.3 %

## 2021-03-09 ENCOUNTER — HOSPITAL ENCOUNTER (OUTPATIENT)
Dept: LAB | Facility: MEDICAL CENTER | Age: 76
End: 2021-03-09
Attending: FAMILY MEDICINE
Payer: MEDICARE

## 2021-03-09 LAB
T3FREE SERPL-MCNC: 2.32 PG/ML (ref 2–4.4)
T4 FREE SERPL-MCNC: 1.32 NG/DL (ref 0.93–1.7)
TSH SERPL DL<=0.005 MIU/L-ACNC: 2.02 UIU/ML (ref 0.38–5.33)

## 2021-03-09 PROCEDURE — 84481 FREE ASSAY (FT-3): CPT

## 2021-03-09 PROCEDURE — 36415 COLL VENOUS BLD VENIPUNCTURE: CPT

## 2021-03-09 PROCEDURE — 84443 ASSAY THYROID STIM HORMONE: CPT

## 2021-03-09 PROCEDURE — 84439 ASSAY OF FREE THYROXINE: CPT

## 2021-03-09 PROCEDURE — 80061 LIPID PANEL: CPT

## 2021-03-09 PROCEDURE — 80053 COMPREHEN METABOLIC PANEL: CPT

## 2021-03-10 LAB
ALBUMIN SERPL BCP-MCNC: 4.1 G/DL (ref 3.2–4.9)
ALBUMIN/GLOB SERPL: 1.4 G/DL
ALP SERPL-CCNC: 65 U/L (ref 30–99)
ALT SERPL-CCNC: 19 U/L (ref 2–50)
ANION GAP SERPL CALC-SCNC: 11 MMOL/L (ref 7–16)
AST SERPL-CCNC: 21 U/L (ref 12–45)
BILIRUB SERPL-MCNC: 0.4 MG/DL (ref 0.1–1.5)
BUN SERPL-MCNC: 16 MG/DL (ref 8–22)
CALCIUM SERPL-MCNC: 9.3 MG/DL (ref 8.5–10.5)
CHLORIDE SERPL-SCNC: 105 MMOL/L (ref 96–112)
CHOLEST SERPL-MCNC: 190 MG/DL (ref 100–199)
CO2 SERPL-SCNC: 25 MMOL/L (ref 20–33)
CREAT SERPL-MCNC: 0.83 MG/DL (ref 0.5–1.4)
FASTING STATUS PATIENT QL REPORTED: NORMAL
GLOBULIN SER CALC-MCNC: 3 G/DL (ref 1.9–3.5)
GLUCOSE SERPL-MCNC: 92 MG/DL (ref 65–99)
HDLC SERPL-MCNC: 71 MG/DL
LDLC SERPL CALC-MCNC: 99 MG/DL
POTASSIUM SERPL-SCNC: 4.1 MMOL/L (ref 3.6–5.5)
PROT SERPL-MCNC: 7.1 G/DL (ref 6–8.2)
SODIUM SERPL-SCNC: 141 MMOL/L (ref 135–145)
TRIGL SERPL-MCNC: 100 MG/DL (ref 0–149)

## 2021-03-17 ENCOUNTER — HOSPITAL ENCOUNTER (OUTPATIENT)
Dept: RADIOLOGY | Facility: MEDICAL CENTER | Age: 76
End: 2021-03-17
Attending: STUDENT IN AN ORGANIZED HEALTH CARE EDUCATION/TRAINING PROGRAM
Payer: MEDICARE

## 2021-03-17 DIAGNOSIS — R06.2 WHEEZING: ICD-10-CM

## 2021-03-17 PROCEDURE — 71046 X-RAY EXAM CHEST 2 VIEWS: CPT

## 2021-04-20 ENCOUNTER — HOSPITAL ENCOUNTER (OUTPATIENT)
Dept: RADIOLOGY | Facility: MEDICAL CENTER | Age: 76
End: 2021-04-20
Attending: FAMILY MEDICINE
Payer: MEDICARE

## 2021-04-20 DIAGNOSIS — Z12.31 VISIT FOR SCREENING MAMMOGRAM: ICD-10-CM

## 2021-04-20 PROCEDURE — 77063 BREAST TOMOSYNTHESIS BI: CPT

## 2021-04-27 ENCOUNTER — HOSPITAL ENCOUNTER (OUTPATIENT)
Dept: RADIOLOGY | Facility: MEDICAL CENTER | Age: 76
End: 2021-04-27
Attending: FAMILY MEDICINE
Payer: MEDICARE

## 2021-04-27 DIAGNOSIS — J01.90 ACUTE SINUSITIS, RECURRENCE NOT SPECIFIED, UNSPECIFIED LOCATION: ICD-10-CM

## 2021-04-27 PROCEDURE — 70210 X-RAY EXAM OF SINUSES: CPT

## 2021-05-06 ENCOUNTER — HOSPITAL ENCOUNTER (OUTPATIENT)
Dept: RADIOLOGY | Facility: MEDICAL CENTER | Age: 76
End: 2021-05-06
Attending: FAMILY MEDICINE
Payer: MEDICARE

## 2021-05-06 DIAGNOSIS — N64.4 MASTODYNIA: ICD-10-CM

## 2021-05-06 PROCEDURE — 76642 ULTRASOUND BREAST LIMITED: CPT | Mod: RT

## 2021-06-03 ENCOUNTER — OFFICE VISIT (OUTPATIENT)
Dept: CARDIOLOGY | Facility: MEDICAL CENTER | Age: 76
End: 2021-06-03
Payer: MEDICARE

## 2021-06-03 VITALS
HEIGHT: 65 IN | DIASTOLIC BLOOD PRESSURE: 88 MMHG | HEART RATE: 64 BPM | OXYGEN SATURATION: 98 % | SYSTOLIC BLOOD PRESSURE: 160 MMHG | BODY MASS INDEX: 31.65 KG/M2 | WEIGHT: 190 LBS

## 2021-06-03 DIAGNOSIS — E78.00 HYPERCHOLESTEREMIA: ICD-10-CM

## 2021-06-03 PROCEDURE — 99213 OFFICE O/P EST LOW 20 MIN: CPT | Performed by: INTERNAL MEDICINE

## 2021-06-03 ASSESSMENT — ENCOUNTER SYMPTOMS
NEUROLOGICAL NEGATIVE: 1
CARDIOVASCULAR NEGATIVE: 1
RESPIRATORY NEGATIVE: 1
CONSTITUTIONAL NEGATIVE: 1
GASTROINTESTINAL NEGATIVE: 1

## 2021-06-03 ASSESSMENT — FIBROSIS 4 INDEX: FIB4 SCORE: 1.29

## 2021-06-03 NOTE — PROGRESS NOTES
Chief Complaint   Patient presents with   • Hyperlipidemia     follow up       Subjective:   Sophia Leahy is a 76 y.o. female who presents today for follow-up of cardiac status and hyperlipidemia.  The patient was initially seen in January 2020 at the urging of her .  She has history of hyperlipidemia and hypothyroidism.  She had a CT coronary calcification scan performed which revealed a score of 34 which puts her in a low risk group.  Recent lab from March of this year reveals a total cholesterol of 190, HDL of 71, LDL of 99 and non-HDL cholesterol of 119.  She has had no chest pain, palpitations or exertional dyspnea.    Past Medical History:   Diagnosis Date   • Thyroid disorder      Past Surgical History:   Procedure Laterality Date   • ABDOMINAL HYSTERECTOMY TOTAL     • LUMPECTOMY     • OTHER ORTHOPEDIC SURGERY Right      History reviewed. No pertinent family history.  Social History     Socioeconomic History   • Marital status:      Spouse name: Not on file   • Number of children: Not on file   • Years of education: Not on file   • Highest education level: Not on file   Occupational History   • Not on file   Tobacco Use   • Smoking status: Never Smoker   • Smokeless tobacco: Never Used   Substance and Sexual Activity   • Alcohol use: Yes     Comment: occasional   • Drug use: No   • Sexual activity: Not on file   Other Topics Concern   • Not on file   Social History Narrative   • Not on file     Social Determinants of Health     Financial Resource Strain:    • Difficulty of Paying Living Expenses:    Food Insecurity:    • Worried About Running Out of Food in the Last Year:    • Ran Out of Food in the Last Year:    Transportation Needs:    • Lack of Transportation (Medical):    • Lack of Transportation (Non-Medical):    Physical Activity:    • Days of Exercise per Week:    • Minutes of Exercise per Session:    Stress:    • Feeling of Stress :    Social Connections:    • Frequency of  "Communication with Friends and Family:    • Frequency of Social Gatherings with Friends and Family:    • Attends Judaism Services:    • Active Member of Clubs or Organizations:    • Attends Club or Organization Meetings:    • Marital Status:    Intimate Partner Violence:    • Fear of Current or Ex-Partner:    • Emotionally Abused:    • Physically Abused:    • Sexually Abused:      Allergies   Allergen Reactions   • Pcn [Penicillins] Hives and Swelling   • Sulfa Drugs      Outpatient Encounter Medications as of 6/3/2021   Medication Sig Dispense Refill   • levothyroxine (SYNTHROID) 75 MCG Tab Take 75 mcg by mouth every 48 hours.     • levothyroxine (SYNTHROID) 50 MCG TABS Take 50 mcg by mouth every 48 hours.     • Multiple Vitamins-Minerals (HAIR/SKIN/NAILS PO) Take 1 Tab by mouth every day.     • L-LYSINE HCL PO Take 1 Tab by mouth every day.     • metronidazole (METROGEL) 0.75 % gel Apply 1 Application to affected area(s) every day. (Patient not taking: Reported on 1/6/2020) 1 Tube 3   • pravastatin (PRAVACHOL) 20 MG TABS Take 20 mg by mouth every evening.     • Calcium Carbonate-Vit D-Min (CALCIUM 1200 PO) Take 1 Tab by mouth every day.     • Multiple Vitamins-Minerals (MULTI FOR HER 50+ PO) Take 1 Tab by mouth every day.       No facility-administered encounter medications on file as of 6/3/2021.     Review of Systems   Constitutional: Negative.    HENT: Negative.    Respiratory: Negative.    Cardiovascular: Negative.    Gastrointestinal: Negative.    Musculoskeletal: Positive for joint pain.   Skin: Negative.    Neurological: Negative.    Endo/Heme/Allergies: Negative.         Objective:   /88 (BP Location: Left arm, Patient Position: Sitting)   Pulse 64   Ht 1.638 m (5' 4.5\")   Wt 86.2 kg (190 lb)   SpO2 98%   BMI 32.11 kg/m²     Physical Exam   Constitutional: She is oriented to person, place, and time. No distress.   HENT:   Head: Normocephalic and atraumatic.   Eyes: Pupils are equal, round, and " reactive to light. No scleral icterus.   Neck: No JVD present.   Cardiovascular: Normal rate and regular rhythm.   No murmur heard.  Pulmonary/Chest: No respiratory distress. She has no wheezes.   Abdominal: Soft. She exhibits no distension. There is no abdominal tenderness.   Neurological: She is alert and oriented to person, place, and time.   Skin: Skin is warm.       Assessment:     1. Hypercholesteremia         Medical Decision Making:  Today's Assessment / Status / Plan:   Hypercholesterolemia: LDL is at target on current medications.  Coronary calcium score was reassuring.  There is no indication for treadmill.  She will return as needed.

## 2021-08-18 ENCOUNTER — HOSPITAL ENCOUNTER (OUTPATIENT)
Dept: LAB | Facility: MEDICAL CENTER | Age: 76
End: 2021-08-18
Attending: NURSE PRACTITIONER
Payer: MEDICARE

## 2021-08-18 LAB
ANION GAP SERPL CALC-SCNC: 12 MMOL/L (ref 7–16)
BASOPHILS # BLD AUTO: 0.4 % (ref 0–1.8)
BASOPHILS # BLD: 0.02 K/UL (ref 0–0.12)
BUN SERPL-MCNC: 17 MG/DL (ref 8–22)
CALCIUM SERPL-MCNC: 9.4 MG/DL (ref 8.4–10.2)
CHLORIDE SERPL-SCNC: 102 MMOL/L (ref 96–112)
CO2 SERPL-SCNC: 25 MMOL/L (ref 20–33)
CREAT SERPL-MCNC: 0.78 MG/DL (ref 0.5–1.4)
CRP SERPL HS-MCNC: <0.3 MG/DL (ref 0–0.75)
EOSINOPHIL # BLD AUTO: 0.09 K/UL (ref 0–0.51)
EOSINOPHIL NFR BLD: 1.7 % (ref 0–6.9)
ERYTHROCYTE [DISTWIDTH] IN BLOOD BY AUTOMATED COUNT: 48 FL (ref 35.9–50)
FASTING STATUS PATIENT QL REPORTED: NORMAL
GLUCOSE SERPL-MCNC: 103 MG/DL (ref 65–99)
HCT VFR BLD AUTO: 45 % (ref 37–47)
HGB BLD-MCNC: 14.9 G/DL (ref 12–16)
IMM GRANULOCYTES # BLD AUTO: 0.01 K/UL (ref 0–0.11)
IMM GRANULOCYTES NFR BLD AUTO: 0.2 % (ref 0–0.9)
LYMPHOCYTES # BLD AUTO: 2 K/UL (ref 1–4.8)
LYMPHOCYTES NFR BLD: 38.5 % (ref 22–41)
MCH RBC QN AUTO: 33.5 PG (ref 27–33)
MCHC RBC AUTO-ENTMCNC: 33.1 G/DL (ref 33.6–35)
MCV RBC AUTO: 101.1 FL (ref 81.4–97.8)
MONOCYTES # BLD AUTO: 0.53 K/UL (ref 0–0.85)
MONOCYTES NFR BLD AUTO: 10.2 % (ref 0–13.4)
NEUTROPHILS # BLD AUTO: 2.54 K/UL (ref 2–7.15)
NEUTROPHILS NFR BLD: 49 % (ref 44–72)
NRBC # BLD AUTO: 0 K/UL
NRBC BLD-RTO: 0 /100 WBC
PLATELET # BLD AUTO: 279 K/UL (ref 164–446)
PMV BLD AUTO: 9.9 FL (ref 9–12.9)
POTASSIUM SERPL-SCNC: 4.3 MMOL/L (ref 3.6–5.5)
RBC # BLD AUTO: 4.45 M/UL (ref 4.2–5.4)
SODIUM SERPL-SCNC: 139 MMOL/L (ref 135–145)
WBC # BLD AUTO: 5.2 K/UL (ref 4.8–10.8)

## 2021-08-18 PROCEDURE — 36415 COLL VENOUS BLD VENIPUNCTURE: CPT

## 2021-08-18 PROCEDURE — 86140 C-REACTIVE PROTEIN: CPT

## 2021-08-18 PROCEDURE — 85025 COMPLETE CBC W/AUTO DIFF WBC: CPT

## 2021-08-18 PROCEDURE — 80048 BASIC METABOLIC PNL TOTAL CA: CPT

## 2022-01-11 ENCOUNTER — HOSPITAL ENCOUNTER (OUTPATIENT)
Dept: LAB | Facility: MEDICAL CENTER | Age: 77
End: 2022-01-11
Attending: FAMILY MEDICINE
Payer: MEDICARE

## 2022-01-11 LAB
ALBUMIN SERPL BCP-MCNC: 4.3 G/DL (ref 3.2–4.9)
ALBUMIN/GLOB SERPL: 1.5 G/DL
ALP SERPL-CCNC: 76 U/L (ref 30–99)
ALT SERPL-CCNC: 23 U/L (ref 2–50)
ANION GAP SERPL CALC-SCNC: 15 MMOL/L (ref 7–16)
AST SERPL-CCNC: 19 U/L (ref 12–45)
BASOPHILS # BLD AUTO: 0.7 % (ref 0–1.8)
BASOPHILS # BLD: 0.04 K/UL (ref 0–0.12)
BILIRUB SERPL-MCNC: 0.6 MG/DL (ref 0.1–1.5)
BUN SERPL-MCNC: 15 MG/DL (ref 8–22)
CALCIUM SERPL-MCNC: 9.5 MG/DL (ref 8.4–10.2)
CHLORIDE SERPL-SCNC: 105 MMOL/L (ref 96–112)
CHOLEST SERPL-MCNC: 251 MG/DL (ref 100–199)
CO2 SERPL-SCNC: 24 MMOL/L (ref 20–33)
CREAT SERPL-MCNC: 0.88 MG/DL (ref 0.5–1.4)
EOSINOPHIL # BLD AUTO: 0.07 K/UL (ref 0–0.51)
EOSINOPHIL NFR BLD: 1.2 % (ref 0–6.9)
ERYTHROCYTE [DISTWIDTH] IN BLOOD BY AUTOMATED COUNT: 47.7 FL (ref 35.9–50)
FASTING STATUS PATIENT QL REPORTED: NORMAL
GLOBULIN SER CALC-MCNC: 2.9 G/DL (ref 1.9–3.5)
GLUCOSE SERPL-MCNC: 100 MG/DL (ref 65–99)
HCT VFR BLD AUTO: 45.7 % (ref 37–47)
HDLC SERPL-MCNC: 68 MG/DL
HGB BLD-MCNC: 14.8 G/DL (ref 12–16)
IMM GRANULOCYTES # BLD AUTO: 0.01 K/UL (ref 0–0.11)
IMM GRANULOCYTES NFR BLD AUTO: 0.2 % (ref 0–0.9)
LDLC SERPL CALC-MCNC: 170 MG/DL
LYMPHOCYTES # BLD AUTO: 1.78 K/UL (ref 1–4.8)
LYMPHOCYTES NFR BLD: 31.5 % (ref 22–41)
MCH RBC QN AUTO: 32.9 PG (ref 27–33)
MCHC RBC AUTO-ENTMCNC: 32.4 G/DL (ref 33.6–35)
MCV RBC AUTO: 101.6 FL (ref 81.4–97.8)
MONOCYTES # BLD AUTO: 0.55 K/UL (ref 0–0.85)
MONOCYTES NFR BLD AUTO: 9.7 % (ref 0–13.4)
NEUTROPHILS # BLD AUTO: 3.2 K/UL (ref 2–7.15)
NEUTROPHILS NFR BLD: 56.7 % (ref 44–72)
NRBC # BLD AUTO: 0 K/UL
NRBC BLD-RTO: 0 /100 WBC
PLATELET # BLD AUTO: 320 K/UL (ref 164–446)
PMV BLD AUTO: 9.4 FL (ref 9–12.9)
POTASSIUM SERPL-SCNC: 4.3 MMOL/L (ref 3.6–5.5)
PROT SERPL-MCNC: 7.2 G/DL (ref 6–8.2)
RBC # BLD AUTO: 4.5 M/UL (ref 4.2–5.4)
SODIUM SERPL-SCNC: 144 MMOL/L (ref 135–145)
T3FREE SERPL-MCNC: 2.22 PG/ML (ref 2–4.4)
T4 FREE SERPL-MCNC: 1.22 NG/DL (ref 0.93–1.7)
TRIGL SERPL-MCNC: 67 MG/DL (ref 0–149)
TSH SERPL DL<=0.005 MIU/L-ACNC: 3.53 UIU/ML (ref 0.38–5.33)
WBC # BLD AUTO: 5.7 K/UL (ref 4.8–10.8)

## 2022-01-11 PROCEDURE — 85025 COMPLETE CBC W/AUTO DIFF WBC: CPT

## 2022-01-11 PROCEDURE — 84443 ASSAY THYROID STIM HORMONE: CPT

## 2022-01-11 PROCEDURE — 80061 LIPID PANEL: CPT

## 2022-01-11 PROCEDURE — 36415 COLL VENOUS BLD VENIPUNCTURE: CPT

## 2022-01-11 PROCEDURE — 84481 FREE ASSAY (FT-3): CPT

## 2022-01-11 PROCEDURE — 80053 COMPREHEN METABOLIC PANEL: CPT

## 2022-01-11 PROCEDURE — 84439 ASSAY OF FREE THYROXINE: CPT

## 2022-01-25 ENCOUNTER — APPOINTMENT (OUTPATIENT)
Dept: RADIOLOGY | Facility: MEDICAL CENTER | Age: 77
End: 2022-01-25
Payer: MEDICARE

## 2022-01-25 ENCOUNTER — HOSPITAL ENCOUNTER (EMERGENCY)
Facility: MEDICAL CENTER | Age: 77
End: 2022-01-25
Payer: MEDICARE

## 2022-01-25 VITALS
RESPIRATION RATE: 18 BRPM | WEIGHT: 179.68 LBS | DIASTOLIC BLOOD PRESSURE: 92 MMHG | TEMPERATURE: 97.4 F | OXYGEN SATURATION: 98 % | HEIGHT: 64 IN | BODY MASS INDEX: 30.67 KG/M2 | HEART RATE: 71 BPM | SYSTOLIC BLOOD PRESSURE: 181 MMHG

## 2022-01-25 LAB
ALBUMIN SERPL BCP-MCNC: 4.1 G/DL (ref 3.2–4.9)
ALBUMIN/GLOB SERPL: 1.5 G/DL
ALP SERPL-CCNC: 78 U/L (ref 30–99)
ALT SERPL-CCNC: 13 U/L (ref 2–50)
ANION GAP SERPL CALC-SCNC: 8 MMOL/L (ref 7–16)
AST SERPL-CCNC: 16 U/L (ref 12–45)
BASOPHILS # BLD AUTO: 0.6 % (ref 0–1.8)
BASOPHILS # BLD: 0.04 K/UL (ref 0–0.12)
BILIRUB SERPL-MCNC: 0.4 MG/DL (ref 0.1–1.5)
BUN SERPL-MCNC: 13 MG/DL (ref 8–22)
CALCIUM SERPL-MCNC: 9.2 MG/DL (ref 8.4–10.2)
CHLORIDE SERPL-SCNC: 104 MMOL/L (ref 96–112)
CO2 SERPL-SCNC: 28 MMOL/L (ref 20–33)
CREAT SERPL-MCNC: 0.72 MG/DL (ref 0.5–1.4)
EKG IMPRESSION: NORMAL
EOSINOPHIL # BLD AUTO: 0.07 K/UL (ref 0–0.51)
EOSINOPHIL NFR BLD: 1.1 % (ref 0–6.9)
ERYTHROCYTE [DISTWIDTH] IN BLOOD BY AUTOMATED COUNT: 47.7 FL (ref 35.9–50)
GLOBULIN SER CALC-MCNC: 2.8 G/DL (ref 1.9–3.5)
GLUCOSE SERPL-MCNC: 89 MG/DL (ref 65–99)
HCT VFR BLD AUTO: 42.7 % (ref 37–47)
HGB BLD-MCNC: 13.9 G/DL (ref 12–16)
IMM GRANULOCYTES # BLD AUTO: 0.02 K/UL (ref 0–0.11)
IMM GRANULOCYTES NFR BLD AUTO: 0.3 % (ref 0–0.9)
LYMPHOCYTES # BLD AUTO: 1.68 K/UL (ref 1–4.8)
LYMPHOCYTES NFR BLD: 27.2 % (ref 22–41)
MCH RBC QN AUTO: 33.3 PG (ref 27–33)
MCHC RBC AUTO-ENTMCNC: 32.6 G/DL (ref 33.6–35)
MCV RBC AUTO: 102.2 FL (ref 81.4–97.8)
MONOCYTES # BLD AUTO: 0.64 K/UL (ref 0–0.85)
MONOCYTES NFR BLD AUTO: 10.4 % (ref 0–13.4)
NEUTROPHILS # BLD AUTO: 3.73 K/UL (ref 2–7.15)
NEUTROPHILS NFR BLD: 60.4 % (ref 44–72)
NRBC # BLD AUTO: 0 K/UL
NRBC BLD-RTO: 0 /100 WBC
PLATELET # BLD AUTO: 271 K/UL (ref 164–446)
PMV BLD AUTO: 9.7 FL (ref 9–12.9)
POTASSIUM SERPL-SCNC: 4.4 MMOL/L (ref 3.6–5.5)
PROT SERPL-MCNC: 6.9 G/DL (ref 6–8.2)
RBC # BLD AUTO: 4.18 M/UL (ref 4.2–5.4)
SODIUM SERPL-SCNC: 140 MMOL/L (ref 135–145)
TROPONIN T SERPL-MCNC: 13 NG/L (ref 6–19)
WBC # BLD AUTO: 6.2 K/UL (ref 4.8–10.8)

## 2022-01-25 PROCEDURE — 71045 X-RAY EXAM CHEST 1 VIEW: CPT

## 2022-01-25 PROCEDURE — 93005 ELECTROCARDIOGRAM TRACING: CPT

## 2022-01-25 PROCEDURE — 302449 STATCHG TRIAGE ONLY (STATISTIC)

## 2022-01-25 PROCEDURE — 84484 ASSAY OF TROPONIN QUANT: CPT

## 2022-01-25 PROCEDURE — 80053 COMPREHEN METABOLIC PANEL: CPT

## 2022-01-25 PROCEDURE — 85025 COMPLETE CBC W/AUTO DIFF WBC: CPT

## 2022-01-25 ASSESSMENT — FIBROSIS 4 INDEX: FIB4 SCORE: 0.95

## 2022-01-25 NOTE — ED TRIAGE NOTES
Pt presents with  from home for htn, nausea, tight jaw and left arm that began 3 days. Symptoms are intermittent. No hx of htn. A&Ox4 and ambulatory.     Has this patient been vaccinated for COVID yes

## 2022-02-10 ENCOUNTER — HOSPITAL ENCOUNTER (OUTPATIENT)
Dept: CARDIOLOGY | Facility: MEDICAL CENTER | Age: 77
End: 2022-02-10
Attending: FAMILY MEDICINE
Payer: MEDICARE

## 2022-02-10 DIAGNOSIS — R94.31 ABNORMAL ELECTROCARDIOGRAM: ICD-10-CM

## 2022-02-10 LAB
LV EJECT FRACT  99904: 75
LV EJECT FRACT MOD 2C 99903: 79.85
LV EJECT FRACT MOD 4C 99902: 57.11
LV EJECT FRACT MOD BP 99901: 70.15

## 2022-02-10 PROCEDURE — 93306 TTE W/DOPPLER COMPLETE: CPT | Mod: 26 | Performed by: INTERNAL MEDICINE

## 2022-02-10 PROCEDURE — 93306 TTE W/DOPPLER COMPLETE: CPT

## 2022-02-15 ENCOUNTER — HOSPITAL ENCOUNTER (OUTPATIENT)
Dept: RADIOLOGY | Facility: MEDICAL CENTER | Age: 77
End: 2022-02-15
Attending: FAMILY MEDICINE
Payer: MEDICARE

## 2022-02-15 DIAGNOSIS — R94.31 ABNORMAL ELECTROCARDIOGRAM: ICD-10-CM

## 2022-02-15 PROCEDURE — 93018 CV STRESS TEST I&R ONLY: CPT | Performed by: INTERNAL MEDICINE

## 2022-02-15 PROCEDURE — 700111 HCHG RX REV CODE 636 W/ 250 OVERRIDE (IP): Performed by: FAMILY MEDICINE

## 2022-02-15 PROCEDURE — 78452 HT MUSCLE IMAGE SPECT MULT: CPT | Mod: MH

## 2022-02-15 PROCEDURE — 78452 HT MUSCLE IMAGE SPECT MULT: CPT | Mod: 26 | Performed by: INTERNAL MEDICINE

## 2022-02-15 RX ORDER — REGADENOSON 0.08 MG/ML
0.4 INJECTION, SOLUTION INTRAVENOUS ONCE
Status: COMPLETED | OUTPATIENT
Start: 2022-02-15 | End: 2022-02-15

## 2022-02-15 RX ORDER — AMINOPHYLLINE 25 MG/ML
100 INJECTION, SOLUTION INTRAVENOUS
Status: DISCONTINUED | OUTPATIENT
Start: 2022-02-15 | End: 2022-02-17 | Stop reason: HOSPADM

## 2022-02-15 RX ADMIN — REGADENOSON 0.4 MG: 0.08 INJECTION, SOLUTION INTRAVENOUS at 10:20

## 2022-02-22 ENCOUNTER — PRE-ADMISSION TESTING (OUTPATIENT)
Dept: ADMISSIONS | Facility: MEDICAL CENTER | Age: 77
End: 2022-02-22
Attending: OBSTETRICS & GYNECOLOGY
Payer: MEDICARE

## 2022-02-22 DIAGNOSIS — Z01.810 PRE-OPERATIVE CARDIOVASCULAR EXAMINATION: ICD-10-CM

## 2022-02-22 DIAGNOSIS — Z01.812 PRE-OPERATIVE LABORATORY EXAMINATION: ICD-10-CM

## 2022-02-22 LAB
ANION GAP SERPL CALC-SCNC: 10 MMOL/L (ref 7–16)
BUN SERPL-MCNC: 18 MG/DL (ref 8–22)
CALCIUM SERPL-MCNC: 9.2 MG/DL (ref 8.5–10.5)
CHLORIDE SERPL-SCNC: 105 MMOL/L (ref 96–112)
CO2 SERPL-SCNC: 24 MMOL/L (ref 20–33)
CREAT SERPL-MCNC: 0.76 MG/DL (ref 0.5–1.4)
EKG IMPRESSION: NORMAL
ERYTHROCYTE [DISTWIDTH] IN BLOOD BY AUTOMATED COUNT: 48.1 FL (ref 35.9–50)
GLUCOSE SERPL-MCNC: 99 MG/DL (ref 65–99)
HCT VFR BLD AUTO: 42.6 % (ref 37–47)
HGB BLD-MCNC: 14 G/DL (ref 12–16)
MCH RBC QN AUTO: 33 PG (ref 27–33)
MCHC RBC AUTO-ENTMCNC: 32.9 G/DL (ref 33.6–35)
MCV RBC AUTO: 100.5 FL (ref 81.4–97.8)
PLATELET # BLD AUTO: 266 K/UL (ref 164–446)
PMV BLD AUTO: 10.4 FL (ref 9–12.9)
POTASSIUM SERPL-SCNC: 4.4 MMOL/L (ref 3.6–5.5)
RBC # BLD AUTO: 4.24 M/UL (ref 4.2–5.4)
SODIUM SERPL-SCNC: 139 MMOL/L (ref 135–145)
WBC # BLD AUTO: 5.8 K/UL (ref 4.8–10.8)

## 2022-02-22 PROCEDURE — 36415 COLL VENOUS BLD VENIPUNCTURE: CPT

## 2022-02-22 PROCEDURE — 80048 BASIC METABOLIC PNL TOTAL CA: CPT

## 2022-02-22 PROCEDURE — 93005 ELECTROCARDIOGRAM TRACING: CPT

## 2022-02-22 PROCEDURE — 93010 ELECTROCARDIOGRAM REPORT: CPT | Performed by: INTERNAL MEDICINE

## 2022-02-22 PROCEDURE — 85027 COMPLETE CBC AUTOMATED: CPT

## 2022-02-22 RX ORDER — LISINOPRIL 10 MG/1
10 TABLET ORAL DAILY
COMMUNITY
Start: 2022-02-21

## 2022-02-22 ASSESSMENT — FIBROSIS 4 INDEX: FIB4 SCORE: 1.28

## 2022-03-04 ENCOUNTER — PRE-ADMISSION TESTING (OUTPATIENT)
Dept: ADMISSIONS | Facility: MEDICAL CENTER | Age: 77
End: 2022-03-04
Attending: OBSTETRICS & GYNECOLOGY
Payer: MEDICARE

## 2022-03-04 DIAGNOSIS — Z01.812 PRE-OPERATIVE LABORATORY EXAMINATION: ICD-10-CM

## 2022-03-04 LAB — COVID ORDER STATUS COVID19: NORMAL

## 2022-03-04 PROCEDURE — U0005 INFEC AGEN DETEC AMPLI PROBE: HCPCS

## 2022-03-04 PROCEDURE — U0003 INFECTIOUS AGENT DETECTION BY NUCLEIC ACID (DNA OR RNA); SEVERE ACUTE RESPIRATORY SYNDROME CORONAVIRUS 2 (SARS-COV-2) (CORONAVIRUS DISEASE [COVID-19]), AMPLIFIED PROBE TECHNIQUE, MAKING USE OF HIGH THROUGHPUT TECHNOLOGIES AS DESCRIBED BY CMS-2020-01-R: HCPCS

## 2022-03-05 LAB
SARS-COV-2 RNA RESP QL NAA+PROBE: NOTDETECTED
SPECIMEN SOURCE: NORMAL

## 2022-03-08 ENCOUNTER — ANESTHESIA (OUTPATIENT)
Dept: SURGERY | Facility: MEDICAL CENTER | Age: 77
End: 2022-03-08
Payer: MEDICARE

## 2022-03-08 ENCOUNTER — ANESTHESIA EVENT (OUTPATIENT)
Dept: SURGERY | Facility: MEDICAL CENTER | Age: 77
End: 2022-03-08
Payer: MEDICARE

## 2022-03-08 ENCOUNTER — HOSPITAL ENCOUNTER (OUTPATIENT)
Facility: MEDICAL CENTER | Age: 77
End: 2022-03-08
Attending: OBSTETRICS & GYNECOLOGY | Admitting: OBSTETRICS & GYNECOLOGY
Payer: MEDICARE

## 2022-03-08 VITALS
RESPIRATION RATE: 22 BRPM | TEMPERATURE: 98.3 F | WEIGHT: 181 LBS | OXYGEN SATURATION: 91 % | HEIGHT: 64 IN | DIASTOLIC BLOOD PRESSURE: 74 MMHG | BODY MASS INDEX: 30.9 KG/M2 | SYSTOLIC BLOOD PRESSURE: 167 MMHG | HEART RATE: 80 BPM

## 2022-03-08 PROCEDURE — 160035 HCHG PACU - 1ST 60 MINS PHASE I: Performed by: OBSTETRICS & GYNECOLOGY

## 2022-03-08 PROCEDURE — 700102 HCHG RX REV CODE 250 W/ 637 OVERRIDE(OP): Performed by: ANESTHESIOLOGY

## 2022-03-08 PROCEDURE — A9270 NON-COVERED ITEM OR SERVICE: HCPCS | Performed by: ANESTHESIOLOGY

## 2022-03-08 PROCEDURE — 700101 HCHG RX REV CODE 250: Performed by: ANESTHESIOLOGY

## 2022-03-08 PROCEDURE — 160047 HCHG PACU  - EA ADDL 30 MINS PHASE II: Performed by: OBSTETRICS & GYNECOLOGY

## 2022-03-08 PROCEDURE — 160025 RECOVERY II MINUTES (STATS): Performed by: OBSTETRICS & GYNECOLOGY

## 2022-03-08 PROCEDURE — 160046 HCHG PACU - 1ST 60 MINS PHASE II: Performed by: OBSTETRICS & GYNECOLOGY

## 2022-03-08 PROCEDURE — 160039 HCHG SURGERY MINUTES - EA ADDL 1 MIN LEVEL 3: Performed by: OBSTETRICS & GYNECOLOGY

## 2022-03-08 PROCEDURE — 700111 HCHG RX REV CODE 636 W/ 250 OVERRIDE (IP): Performed by: ANESTHESIOLOGY

## 2022-03-08 PROCEDURE — 501330 HCHG SET, CYSTO IRRIG TUBING: Performed by: OBSTETRICS & GYNECOLOGY

## 2022-03-08 PROCEDURE — 700105 HCHG RX REV CODE 258: Performed by: OBSTETRICS & GYNECOLOGY

## 2022-03-08 PROCEDURE — 501838 HCHG SUTURE GENERAL: Performed by: OBSTETRICS & GYNECOLOGY

## 2022-03-08 PROCEDURE — 700101 HCHG RX REV CODE 250: Performed by: OBSTETRICS & GYNECOLOGY

## 2022-03-08 PROCEDURE — 500892 HCHG PACK, PERI-GYN: Performed by: OBSTETRICS & GYNECOLOGY

## 2022-03-08 PROCEDURE — 160009 HCHG ANES TIME/MIN: Performed by: OBSTETRICS & GYNECOLOGY

## 2022-03-08 PROCEDURE — 160048 HCHG OR STATISTICAL LEVEL 1-5: Performed by: OBSTETRICS & GYNECOLOGY

## 2022-03-08 PROCEDURE — 160002 HCHG RECOVERY MINUTES (STAT): Performed by: OBSTETRICS & GYNECOLOGY

## 2022-03-08 PROCEDURE — 160028 HCHG SURGERY MINUTES - 1ST 30 MINS LEVEL 3: Performed by: OBSTETRICS & GYNECOLOGY

## 2022-03-08 PROCEDURE — 160036 HCHG PACU - EA ADDL 30 MINS PHASE I: Performed by: OBSTETRICS & GYNECOLOGY

## 2022-03-08 RX ORDER — PROMETHAZINE HYDROCHLORIDE 25 MG/1
25 SUPPOSITORY RECTAL EVERY 4 HOURS PRN
Status: DISCONTINUED | OUTPATIENT
Start: 2022-03-08 | End: 2022-03-08 | Stop reason: HOSPADM

## 2022-03-08 RX ORDER — HYDROMORPHONE HYDROCHLORIDE 1 MG/ML
0.2 INJECTION, SOLUTION INTRAMUSCULAR; INTRAVENOUS; SUBCUTANEOUS
Status: DISCONTINUED | OUTPATIENT
Start: 2022-03-08 | End: 2022-03-08 | Stop reason: HOSPADM

## 2022-03-08 RX ORDER — SODIUM CHLORIDE, SODIUM LACTATE, POTASSIUM CHLORIDE, CALCIUM CHLORIDE 600; 310; 30; 20 MG/100ML; MG/100ML; MG/100ML; MG/100ML
INJECTION, SOLUTION INTRAVENOUS CONTINUOUS
Status: ACTIVE | OUTPATIENT
Start: 2022-03-08 | End: 2022-03-08

## 2022-03-08 RX ORDER — HYDROMORPHONE HYDROCHLORIDE 1 MG/ML
0.1 INJECTION, SOLUTION INTRAMUSCULAR; INTRAVENOUS; SUBCUTANEOUS
Status: DISCONTINUED | OUTPATIENT
Start: 2022-03-08 | End: 2022-03-08 | Stop reason: HOSPADM

## 2022-03-08 RX ORDER — CEFAZOLIN SODIUM 1 G/3ML
INJECTION, POWDER, FOR SOLUTION INTRAMUSCULAR; INTRAVENOUS PRN
Status: DISCONTINUED | OUTPATIENT
Start: 2022-03-08 | End: 2022-03-08 | Stop reason: SURG

## 2022-03-08 RX ORDER — OXYCODONE HCL 5 MG/5 ML
10 SOLUTION, ORAL ORAL
Status: DISCONTINUED | OUTPATIENT
Start: 2022-03-08 | End: 2022-03-08 | Stop reason: HOSPADM

## 2022-03-08 RX ORDER — HALOPERIDOL 5 MG/ML
1 INJECTION INTRAMUSCULAR
Status: DISCONTINUED | OUTPATIENT
Start: 2022-03-08 | End: 2022-03-08 | Stop reason: HOSPADM

## 2022-03-08 RX ORDER — LIDOCAINE HYDROCHLORIDE 20 MG/ML
INJECTION, SOLUTION EPIDURAL; INFILTRATION; INTRACAUDAL; PERINEURAL PRN
Status: DISCONTINUED | OUTPATIENT
Start: 2022-03-08 | End: 2022-03-08 | Stop reason: SURG

## 2022-03-08 RX ORDER — DIPHENHYDRAMINE HYDROCHLORIDE 50 MG/ML
12.5 INJECTION INTRAMUSCULAR; INTRAVENOUS
Status: DISCONTINUED | OUTPATIENT
Start: 2022-03-08 | End: 2022-03-08 | Stop reason: HOSPADM

## 2022-03-08 RX ORDER — DEXAMETHASONE SODIUM PHOSPHATE 4 MG/ML
INJECTION, SOLUTION INTRA-ARTICULAR; INTRALESIONAL; INTRAMUSCULAR; INTRAVENOUS; SOFT TISSUE PRN
Status: DISCONTINUED | OUTPATIENT
Start: 2022-03-08 | End: 2022-03-08 | Stop reason: SURG

## 2022-03-08 RX ORDER — CELECOXIB 200 MG/1
200 CAPSULE ORAL ONCE
Status: COMPLETED | OUTPATIENT
Start: 2022-03-08 | End: 2022-03-08

## 2022-03-08 RX ORDER — ONDANSETRON 2 MG/ML
4 INJECTION INTRAMUSCULAR; INTRAVENOUS
Status: DISCONTINUED | OUTPATIENT
Start: 2022-03-08 | End: 2022-03-08 | Stop reason: HOSPADM

## 2022-03-08 RX ORDER — HYDROMORPHONE HYDROCHLORIDE 1 MG/ML
0.4 INJECTION, SOLUTION INTRAMUSCULAR; INTRAVENOUS; SUBCUTANEOUS
Status: DISCONTINUED | OUTPATIENT
Start: 2022-03-08 | End: 2022-03-08 | Stop reason: HOSPADM

## 2022-03-08 RX ORDER — SODIUM CHLORIDE, SODIUM LACTATE, POTASSIUM CHLORIDE, CALCIUM CHLORIDE 600; 310; 30; 20 MG/100ML; MG/100ML; MG/100ML; MG/100ML
INJECTION, SOLUTION INTRAVENOUS CONTINUOUS
Status: DISCONTINUED | OUTPATIENT
Start: 2022-03-08 | End: 2022-03-08 | Stop reason: HOSPADM

## 2022-03-08 RX ORDER — ONDANSETRON 2 MG/ML
INJECTION INTRAMUSCULAR; INTRAVENOUS PRN
Status: DISCONTINUED | OUTPATIENT
Start: 2022-03-08 | End: 2022-03-08 | Stop reason: SURG

## 2022-03-08 RX ORDER — ACETAMINOPHEN 500 MG
1000 TABLET ORAL ONCE
Status: COMPLETED | OUTPATIENT
Start: 2022-03-08 | End: 2022-03-08

## 2022-03-08 RX ORDER — OXYCODONE HCL 5 MG/5 ML
5 SOLUTION, ORAL ORAL
Status: DISCONTINUED | OUTPATIENT
Start: 2022-03-08 | End: 2022-03-08 | Stop reason: HOSPADM

## 2022-03-08 RX ORDER — BUPIVACAINE HYDROCHLORIDE AND EPINEPHRINE 2.5; 5 MG/ML; UG/ML
INJECTION, SOLUTION EPIDURAL; INFILTRATION; INTRACAUDAL; PERINEURAL
Status: DISCONTINUED | OUTPATIENT
Start: 2022-03-08 | End: 2022-03-08 | Stop reason: HOSPADM

## 2022-03-08 RX ADMIN — SODIUM CHLORIDE, POTASSIUM CHLORIDE, SODIUM LACTATE AND CALCIUM CHLORIDE: 600; 310; 30; 20 INJECTION, SOLUTION INTRAVENOUS at 11:21

## 2022-03-08 RX ADMIN — ONDANSETRON 4 MG: 2 INJECTION INTRAMUSCULAR; INTRAVENOUS at 11:43

## 2022-03-08 RX ADMIN — SUGAMMADEX 200 MG: 100 INJECTION, SOLUTION INTRAVENOUS at 12:32

## 2022-03-08 RX ADMIN — MIDAZOLAM 2 MG: 1 INJECTION INTRAMUSCULAR; INTRAVENOUS at 11:43

## 2022-03-08 RX ADMIN — CELECOXIB 200 MG: 200 CAPSULE ORAL at 11:06

## 2022-03-08 RX ADMIN — FENTANYL CITRATE 100 MCG: 50 INJECTION, SOLUTION INTRAMUSCULAR; INTRAVENOUS at 11:43

## 2022-03-08 RX ADMIN — ACETAMINOPHEN 1000 MG: 500 TABLET ORAL at 11:06

## 2022-03-08 RX ADMIN — LIDOCAINE HYDROCHLORIDE 100 MG: 20 INJECTION, SOLUTION EPIDURAL; INFILTRATION; INTRACAUDAL at 11:43

## 2022-03-08 RX ADMIN — LIDOCAINE HYDROCHLORIDE 0.5 ML: 10 INJECTION, SOLUTION EPIDURAL; INFILTRATION; INTRACAUDAL; PERINEURAL at 11:06

## 2022-03-08 RX ADMIN — SODIUM CHLORIDE, POTASSIUM CHLORIDE, SODIUM LACTATE AND CALCIUM CHLORIDE: 600; 310; 30; 20 INJECTION, SOLUTION INTRAVENOUS at 12:30

## 2022-03-08 RX ADMIN — ROCURONIUM BROMIDE 40 MG: 10 INJECTION, SOLUTION INTRAVENOUS at 11:43

## 2022-03-08 RX ADMIN — DEXAMETHASONE SODIUM PHOSPHATE 4 MG: 4 INJECTION, SOLUTION INTRA-ARTICULAR; INTRALESIONAL; INTRAMUSCULAR; INTRAVENOUS; SOFT TISSUE at 11:43

## 2022-03-08 RX ADMIN — PROPOFOL 150 MG: 10 INJECTION, EMULSION INTRAVENOUS at 11:43

## 2022-03-08 RX ADMIN — CEFAZOLIN 2 G: 330 INJECTION, POWDER, FOR SOLUTION INTRAMUSCULAR; INTRAVENOUS at 11:34

## 2022-03-08 ASSESSMENT — PAIN SCALES - GENERAL: PAIN_LEVEL: 0

## 2022-03-08 ASSESSMENT — PAIN DESCRIPTION - PAIN TYPE
TYPE: SURGICAL PAIN

## 2022-03-08 ASSESSMENT — FIBROSIS 4 INDEX: FIB4 SCORE: 1.3

## 2022-03-08 NOTE — ANESTHESIA PREPROCEDURE EVALUATION
Case: [id number] Date/Time: 03/08/22 1215    Procedures:       COLPORRHAPHY, COMBINED ANTEROPOSTERIOR - POSSIBLE POSTERIOR      CYSTOSCOPY    Pre-op diagnosis: PROLAPSE, CYSTOCELE    Location: [facility] OR 17 / SURGERY [facility]    Surgeons: Joyce Alvarez D.O.          Relevant Problems   PULMONARY   (positive) Dyspnea on exertion      NEURO   (positive) History of skin cancer      CARDIAC   (positive) Dyspnea on exertion      ENDO   (positive) Hypothyroidism (acquired)       Physical Exam    Airway   Mallampati: II  TM distance: >3 FB  Neck ROM: full       Cardiovascular - normal exam  Rhythm: regular  Rate: normal  (-) murmur     Dental - normal exam           Pulmonary - normal exam  Breath sounds clear to auscultation     Abdominal    Neurological - normal exam                 Anesthesia Plan    ASA 2       Plan - general       Airway plan will be ETT          Induction: intravenous    Postoperative Plan: Postoperative administration of opioids is intended.    Pertinent diagnostic labs and testing reviewed    Informed Consent:    Anesthetic plan and risks discussed with patient.    Use of blood products discussed with: patient whom consented to blood products.

## 2022-03-08 NOTE — OR NURSING
Awake, alert and tolerating PO fluids. Pt declines pain. Vitals stable.  On monitors with alarms audible.    Called family member with update left voicemail

## 2022-03-08 NOTE — ANESTHESIA POSTPROCEDURE EVALUATION
Patient: Sophia Leahy    Procedure Summary     Date: 03/08/22 Room / Location: [place] [address] / SURGERY [facility]    Anesthesia Start: 1134 Anesthesia Stop: 1247    Procedures:       COLPORRHAPHY, COMBINED ANTEROPOSTERIOR - POSSIBLE POSTERIOR (Bladder)      CYSTOSCOPY (N/A Bladder) Diagnosis: (PROLAPSE, CYSTOCELE)    Surgeons: Joyce Alvarez D.O. Responsible Provider: Myron Blount M.D.    Anesthesia Type: general ASA Status: 2          Final Anesthesia Type: general  Last vitals  BP   Blood Pressure : (!) 174/77    Temp   36.8 °C (98.3 °F)    Pulse   80   Resp   14    SpO2   99 %      Anesthesia Post Evaluation    Patient location during evaluation: PACU  Patient participation: complete - patient participated  Level of consciousness: awake and alert  Pain score: 0    Airway patency: patent  Anesthetic complications: no  Cardiovascular status: hemodynamically stable  Respiratory status: acceptable  Hydration status: euvolemic    PONV: none          No complications documented.     Nurse Pain Score: 0 (NPRS)

## 2022-03-08 NOTE — ANESTHESIA PROCEDURE NOTES
Airway    Date/Time: 3/8/2022 11:43 AM  Performed by: Myron Blount M.D.  Authorized by: Myron Blount M.D.     Location:  OR  Urgency:  Elective  Indications for Airway Management:  Anesthesia      Spontaneous Ventilation: absent    Sedation Level:  Deep  Preoxygenated: Yes    Patient Position:  Sniffing  Final Airway Type:  Endotracheal airway  Final Endotracheal Airway:  ETT  Cuffed: Yes    Technique Used for Successful ETT Placement:  Direct laryngoscopy    Insertion Site:  Oral  Blade Type:  Oneill  Laryngoscope Blade/Videolaryngoscope Blade Size:  2  ETT Size (mm):  7.0  Measured from:  Teeth  ETT to Teeth (cm):  21  Placement Verified by: auscultation and capnometry    Cormack-Lehane Classification:  Grade I - full view of glottis  Number of Attempts at Approach:  1

## 2022-03-08 NOTE — PROGRESS NOTES
Placed call to Dr Blount regarding patient's elevated blood pressure. Patient is asymptomatic at this time. Ok to resume with patient discharge to home per Dr Blount.    Discharge information reviewed with patient and responsible adult. No questions or concerns at this time. IV DC'd.

## 2022-03-08 NOTE — DISCHARGE INSTRUCTIONS
Anterior and Posterior Colporrhaphy and Sling Procedure, Care After  This sheet gives you information about how to care for yourself after your procedure. Your health care provider may also give you more specific instructions. If you have problems or questions, contact your health care provider.  What can I expect after the procedure?  After the procedure, it is common to have:  · Pain in the surgical area.  · Vaginal discharge. You will need to use a sanitary pad during this time.  · Fatigue.  Follow these instructions at home:  Incision care    · Follow instructions from your health care provider about how to take care of your incision. Make sure you:  ? Wash your hands with soap and water before touching the incision area. If soap and water are not available, use hand .  ? Clean your incision as told by your health care provider.  ? Leave stitches (sutures), skin glue, or adhesive strips in place. These skin closures may need to stay in place for 2 weeks or longer. If adhesive strip edges start to loosen and curl up, you may trim the loose edges. Do not remove adhesive strips completely unless your health care provider tells you to do that.  · Check your incision area every day for signs of infection. Check for:  ? Redness, swelling, or pain.  ? Fluid or blood.  ? Warmth.  ? Pus or a bad smell.  · Check your incision every day to make sure the incision area is not  or opening.  · Do not take baths, swim, or use a hot tub until your health care provider approves. You may shower.  · Keep the area between your vagina and rectum (perineal area) clean and dry. Make sure you clean the area after each bowel movement and each time you urinate.  · Ask your health care provider if you can take a sitz bath or sit in a tub of clean, warm water.  Activity  · Do gentle, daily activity as told by your health care provider. You may be told to take short walks every day and go farther each time. Ask your health  care provider what activities are safe for you.  · Limit stair climbing to once or twice a day in the first week, then slowly increase this activity.  · Do not lift anything that is heavier than 10 lbs. (4.5 kg), or the limit that your health care provider tells you, until he or she says that it is safe. Avoid pushing or pulling motions.  · Avoid standing for long periods of time.  · Do not douche, use tampons, or have sex until your health care provider says it is okay.  · Do not drive or use heavy machinery while taking prescription pain medicine.  To prevent constipation  · To prevent or treat constipation while you are taking prescription pain medicine, your health care provider may recommend that you:  ? Take over-the-counter or prescription medicines.  ? Eat foods that are high in fiber, such as fresh fruits and vegetables, whole grains, and beans.  ? Drink enough fluid to keep your urine clear or pale yellow.  ? Limit foods that are high in fat and processed sugars, such as fried and sweet foods.  General instructions  · You may be instructed to do pelvic floor exercises (kegels) as told by your health care provider.  · Take over-the-counter and prescription medicines only as told by your health care provider.  · Keep all follow-up visits as told by your health care provider. This is important.  Contact a health care provider if:  · Medicine does not help your pain.  · You have frequent or urgent urination, or you are unable to completely empty your bladder.  · You feel a burning sensation when urinating.  · You have fluid or blood coming from your incision.  · You have pus or a bad smell coming from the incision.  · Your incision feels warm to the touch.  · You have redness, swelling, or pain around your incision.  Get help right away if:  · You have a fever or chills.  · Your incision separates or opens.  · You cannot urinate.  · You have trouble breathing.  Summary  · After the procedure, it is common to  have pain, fatigue, and discharge from the vagina.  · Keep the area between your vagina and rectum (perineal area) clean and dry. Make sure you clean the area after each bowel movement and each time you urinate.  · Follow instructions from your health care provider on any activity restrictions after the procedure.  This information is not intended to replace advice given to you by your health care provider. Make sure you discuss any questions you have with your health care provider.  Document Released: 08/01/2005 Document Revised: 11/30/2018 Document Reviewed: 12/18/2017  [employer] Patient Education © 2020 [employer]    ACTIVITY: Rest and take it easy for the first 24 hours.  A responsible adult is recommended to remain with you during that time.  It is normal to feel sleepy.  We encourage you to not do anything that requires balance, judgment or coordination.    MILD FLU-LIKE SYMPTOMS ARE NORMAL. YOU MAY EXPERIENCE GENERALIZED MUSCLE ACHES, THROAT IRRITATION, HEADACHE AND/OR SOME NAUSEA.    FOR 24 HOURS DO NOT:  Drive, operate machinery or run household appliances.  Drink beer or alcoholic beverages.   Make important decisions or sign legal documents.    SPECIAL INSTRUCTIONS:Follow any instructions given to you by Dr. Alvarez     DIET: To avoid nausea, slowly advance diet as tolerated, avoiding spicy or greasy foods for the first day.  Add more substantial food to your diet according to your physician's instructions.  Babies can be fed formula or breast milk as soon as they are hungry.  INCREASE FLUIDS AND FIBER TO AVOID CONSTIPATION.    SURGICAL DRESSING/BATHING: May shower       FOLLOW-UP APPOINTMENT:  A follow-up appointment should be arranged with your doctor; call to schedule.    You should CALL YOUR PHYSICIAN if you develop:  Fever greater than 101 degrees F.  Pain not relieved by medication, or persistent nausea or vomiting.  Excessive bleeding (blood soaking through dressing) or unexpected drainage  from the wound.  Extreme redness or swelling around the incision site, drainage of pus or foul smelling drainage.  Inability to urinate or empty your bladder within 8 hours.  Problems with breathing or chest pain.    You should call 911 if you develop problems with breathing or chest pain.  If you are unable to contact your doctor or surgical center, you should go to the nearest emergency room or urgent care center.  Physician's telephone #: [phone]    If any questions arise, call your doctor.  If your doctor is not available, please feel free to call the Surgical Center at [phone].     A registered nurse may call you a few days after your surgery to see how you are doing after your procedure.    MEDICATIONS: Resume taking daily medication.  Take prescribed pain medication with food.  If no medication is prescribed, you may take non-aspirin pain medication if needed.  PAIN MEDICATION CAN BE VERY CONSTIPATING.  Take a stool softener or laxative such as senokot, pericolace, or milk of magnesia if needed.    Prescription medication's at home. You did not take any pain medication while in recovery today, you may take something when needed.     If your physician has prescribed pain medication that includes Acetaminophen (Tylenol), do not take additional Acetaminophen (Tylenol) while taking the prescribed medication.    Depression / Suicide Risk    As you are discharged from this [facility] facility, it is important to learn how to keep safe from harming yourself.    Recognize the warning signs:  · Abrupt changes in personality, positive or negative- including increase in energy   · Giving away possessions  · Change in eating patterns- significant weight changes-  positive or negative  · Change in sleeping patterns- unable to sleep or sleeping all the time   · Unwillingness or inability to communicate  · Depression  · Unusual sadness, discouragement and loneliness  · Talk of wanting to die  · Neglect of  personal appearance   · Rebelliousness- reckless behavior  · Withdrawal from people/activities they love  · Confusion- inability to concentrate     If you or a loved one observes any of these behaviors or has concerns about self-harm, here's what you can do:  · Talk about it- your feelings and reasons for harming yourself  · Remove any means that you might use to hurt yourself (examples: pills, rope, extension cords, firearm)  · Get professional help from the community (Mental Health, Substance Abuse, psychological counseling)  · Do not be alone:Call your Safe Contact- someone whom you trust who will be there for you.  · Call your local CRISIS HOTLINE 394-7309 or 821-372-0526  · Call your local Children's Mobile Crisis Response Team [place] (220) 074-4009 or www.[employer]  · Call the toll free National Suicide Prevention Hotlines   · National Suicide Prevention Lifeline 499-307-DSRM (8751)  · National Hope Line Network 800-SUICIDE (716-3487)

## 2022-03-08 NOTE — H&P
History and physical  CC: cystocele   Planned procedure: Anterior repair possible posterior repair, cystoscopy    Sophai Leahy is a 77 y.o.  who presents today for pelvic organ prolapse.  Patient has history of hysterectomy.     OB History:    , NSVDs    Review of Systems:   Pertinent positives documented in HPI and all other systems reviewed & are negative.    All PMH, PSH, allergies, social history and FH reviewed and updated today:  Past Medical History:  Past Medical History:   Diagnosis Date   • Anesthesia 2022    sister  from halothane   • Bowel habit changes 2022    constipation   • High cholesterol    • Hypertension    • Thyroid disorder    • Urinary bladder disorder 2022    prolapsed bladder       Past Surgical History:  Past Surgical History:   Procedure Laterality Date   • ABDOMINAL HYSTERECTOMY TOTAL     • CATARACT EXTRACTION WITH IOL     • LUMPECTOMY     • OTHER ORTHOPEDIC SURGERY Right     knee       Medications:   No current Epic-ordered facility-administered medications on file.     Current Outpatient Medications Ordered in Epic   Medication Sig Dispense Refill   • lisinopril (PRINIVIL) 10 MG Tab      • Ascorbic Acid (VITAMIN C PO) Take  by mouth.     • VITAMIN E PO Take  by mouth.     • Cholecalciferol (VITAMIN D3 PO) Take  by mouth.     • Cyanocobalamin (VITAMIN B-12 PO) Take  by mouth.     • Multiple Vitamins-Minerals (ZINC PO) Take  by mouth.     • levothyroxine (SYNTHROID) 75 MCG Tab Take 75 mcg by mouth every 48 hours.     • levothyroxine (SYNTHROID) 50 MCG TABS Take 50 mcg by mouth every 48 hours.     • pravastatin (PRAVACHOL) 20 MG TABS Take 20 mg by mouth every evening.     • L-LYSINE HCL PO Take 1 Tab by mouth every day.         Allergies: Pcn [penicillins] and Sulfa drugs    Social History:  Social History     Socioeconomic History   • Marital status:    Tobacco Use   • Smoking status: Never Smoker   • Smokeless tobacco: Never Used   Vaping Use   •  "Vaping Use: Never used   Substance and Sexual Activity   • Alcohol use: Yes     Comment: occasional:2-4 drinks/month   • Drug use: No       Family History:  No family history on file.        Objective:   Vitals:  Ht 1.626 m (5' 4\")   Wt 81.4 kg (179 lb 7.3 oz)   Body mass index is 30.8 kg/m². (Goal BM I>18 <25)    Physical Exam:   Nursing note and vitals reviewed.  GENERAL: No acute distress  HENT: Atraumatic, normocephalic  EYES: Extraocular movements intact, pupils equal and reactive to light  NECK: Supple, Full ROM  CHEST: No deformities, Equal chest expansion  RESP: Unlabored, no stridor or audible wheeze  ABD: Soft, Nontender, Non-Distended  Extremities: No Clubbing, Cyanosis, or Edema  Skin: Warm/dry, without rashes  Neuro: A/O x 4, CN 2-12 Grossly intact, Motor/sensory grossly intact  Psych: Normal mood, behavior, and affect    Genitourinary pelvic exam in the office reveals  Normal appearing external female genitalia without any rashes, lesions, labial fusion or tenderness.  Normal appearing urethral meatus.  Pale pink, flattened rugae.  POP Q:  Aa: 0 Ba: 0 C: 6  Gh: 2 PB: 4 TVL: -2  Ap: -2 Bp: -2 D: NA       Assessment/Plan:   Sophia Leahy is a 77 y.o.  female who presents for above-noted surgical procedure due to prolapse    #We will plan to perform above-noted surgery.  I have discussed risks, benefits, and alternatives with the patient.  She reports that her questions have been answered to her satisfaction and she has signed her own consent.  Preoperative planning and postoperative expectations have been reviewed with the patient.  Prescriptions have been sent to pharmacy.  She has been scheduled for her postoperative appointment.  We will plan for outpatient surgery today.    "

## 2022-03-08 NOTE — OP REPORT
DATE OF OPERATION:  3/8/22    PREOPERATIVE DIAGNOSES:  Cystocele, pelvic organ prolapse    POSTOPERATIVE DIAGNOSES: same    PROCEDURES PERFORMED:    Anterior repair, cystoscopy    SURGEON: Joyce Alvarez DO    ASSISTANT: Cheng Miller MD    ANESTHESIA:  General endotracheal - please see anesthesia report for full details    ANESTHESIOLOGIST: Dr. Blonut    ESTIMATED BLOOD LOSS: 15mL      FINDINGS:  Atrophic but otherwise normal-appearing external female genitalia.  Uterus and cervix surgically absent. Vaginal mucosa is pale pink.  Anterior wall comes to the level of the introitis.  Good apical and posterior support.  Narrow introitus, only about 3cm.  On cystoscopy normal-appearing intact bladder is present with no lesions, foreign material, or injury.  Bilateral ureteral orifice ease are well visualized with efflux bilaterally.      PROCEDURE: Patient was identified in the PACU as correct patient and procedure was verified with the patient.  Informed consent with discussion of risks benefits and alternatives had been thoroughly reviewed with the patient and she has signed her own consent form.  She was brought back to the operating room with anesthesia on standby.  She was then transferred to the operating room table and general endotracheal anesthesia was induced without difficulty.  She was placed in dorsal lithotomy position with careful attention to pad all flexion points. SCDs were confirmed to be on and functioning.  A timeout was performed with everyone in agreement.  Exam under anesthesia was performed with findings as noted above.  She was then prepped and draped in the usual sterile fashion.  A right angle retractor was used posteriorly.  Anterior portion of cystocele was grasped with an allis.    The vaginal epithelium was infiltrated with 0.25% marcaine with epinephrine.  An incision was made vertically in the anterior vaginal wall.   A very thin layer of vaginal epithelium was sharply dissected  from the underlying fascia using Metzenbaum scissors until the dissection reached more substantial fascia out laterally.  This fascia was brought together with interrupted mattress sutures using 2-0 vicryl.  This provided excellent support.  Some excess vaginal mucosa was trimmed.  The vaginal mucosa was then re approximated with a running suture of 3-0 vicryl.      Cystourethroscopy was then performed.  Bilateral ureteral jets of urine were seen.  The bladder mucosa was inspected systematically for a full 360 degree view.  There were no mucosa lesions, no sutures or foreign bodies present.  The bladder was drained.    All laps and instrument counts were correct.  The patient was extubated without difficulty.  She was taken to recovery in stable condition.

## 2022-03-08 NOTE — OR NURSING
Arrived to Phase II after report from, Annabella CHRISTINE, denies nausea, reports no pain. Pt. Able to void on arrival to discharge. Ambulated from gurney to chair with standby assist.    Surgical site- no incision. Scant amount of light pink blood from yadi area. Pad and mesh underwear in place.     Alarms on and set to appropriate parameters. Call light within reach, rounding in place. Belongings given back to pt. Family at bedside.

## 2022-03-08 NOTE — ANESTHESIA TIME REPORT
Anesthesia Start and Stop Event Times     Date Time Event    3/8/2022 1105 Ready for Procedure     1134 Anesthesia Start     1247 Anesthesia Stop        Responsible Staff  03/08/22    Name Role Begin End    Myron Blount M.D. Anesth 1134 1247        Preop Diagnosis (Free Text):  Pre-op Diagnosis     PROLAPSE, CYSTOCELE        Preop Diagnosis (Codes):    Premium Reason  Non-Premium    Comments:

## 2022-11-02 ENCOUNTER — HOSPITAL ENCOUNTER (OUTPATIENT)
Dept: LAB | Facility: MEDICAL CENTER | Age: 77
End: 2022-11-02
Attending: FAMILY MEDICINE
Payer: MEDICARE

## 2022-11-02 LAB
25(OH)D3 SERPL-MCNC: 35 NG/ML (ref 30–100)
ALBUMIN SERPL BCP-MCNC: 4.3 G/DL (ref 3.2–4.9)
ALBUMIN/GLOB SERPL: 1.7 G/DL
ALP SERPL-CCNC: 65 U/L (ref 30–99)
ALT SERPL-CCNC: 19 U/L (ref 2–50)
ANION GAP SERPL CALC-SCNC: 11 MMOL/L (ref 7–16)
APPEARANCE UR: ABNORMAL
AST SERPL-CCNC: 20 U/L (ref 12–45)
BACTERIA #/AREA URNS HPF: ABNORMAL /HPF
BASOPHILS # BLD AUTO: 0.5 % (ref 0–1.8)
BASOPHILS # BLD: 0.03 K/UL (ref 0–0.12)
BILIRUB SERPL-MCNC: 0.5 MG/DL (ref 0.1–1.5)
BILIRUB UR QL STRIP.AUTO: NEGATIVE
BUN SERPL-MCNC: 23 MG/DL (ref 8–22)
CALCIUM SERPL-MCNC: 9.3 MG/DL (ref 8.5–10.5)
CHLORIDE SERPL-SCNC: 107 MMOL/L (ref 96–112)
CHOLEST SERPL-MCNC: 196 MG/DL (ref 100–199)
CO2 SERPL-SCNC: 25 MMOL/L (ref 20–33)
COLOR UR: YELLOW
CREAT SERPL-MCNC: 0.87 MG/DL (ref 0.5–1.4)
EOSINOPHIL # BLD AUTO: 0.13 K/UL (ref 0–0.51)
EOSINOPHIL NFR BLD: 2.3 % (ref 0–6.9)
EPI CELLS #/AREA URNS HPF: ABNORMAL /HPF
ERYTHROCYTE [DISTWIDTH] IN BLOOD BY AUTOMATED COUNT: 48.2 FL (ref 35.9–50)
FASTING STATUS PATIENT QL REPORTED: NORMAL
FOLATE SERPL-MCNC: 17.1 NG/ML
GFR SERPLBLD CREATININE-BSD FMLA CKD-EPI: 68 ML/MIN/1.73 M 2
GLOBULIN SER CALC-MCNC: 2.6 G/DL (ref 1.9–3.5)
GLUCOSE SERPL-MCNC: 94 MG/DL (ref 65–99)
GLUCOSE UR STRIP.AUTO-MCNC: NEGATIVE MG/DL
HCT VFR BLD AUTO: 41.7 % (ref 37–47)
HDLC SERPL-MCNC: 71 MG/DL
HGB BLD-MCNC: 13.6 G/DL (ref 12–16)
HYALINE CASTS #/AREA URNS LPF: ABNORMAL /LPF
IMM GRANULOCYTES # BLD AUTO: 0.01 K/UL (ref 0–0.11)
IMM GRANULOCYTES NFR BLD AUTO: 0.2 % (ref 0–0.9)
KETONES UR STRIP.AUTO-MCNC: NEGATIVE MG/DL
LDLC SERPL CALC-MCNC: 114 MG/DL
LEUKOCYTE ESTERASE UR QL STRIP.AUTO: ABNORMAL
LYMPHOCYTES # BLD AUTO: 1.91 K/UL (ref 1–4.8)
LYMPHOCYTES NFR BLD: 34.3 % (ref 22–41)
MCH RBC QN AUTO: 33.7 PG (ref 27–33)
MCHC RBC AUTO-ENTMCNC: 32.6 G/DL (ref 33.6–35)
MCV RBC AUTO: 103.2 FL (ref 81.4–97.8)
MICRO URNS: ABNORMAL
MONOCYTES # BLD AUTO: 0.55 K/UL (ref 0–0.85)
MONOCYTES NFR BLD AUTO: 9.9 % (ref 0–13.4)
NEUTROPHILS # BLD AUTO: 2.94 K/UL (ref 2–7.15)
NEUTROPHILS NFR BLD: 52.8 % (ref 44–72)
NITRITE UR QL STRIP.AUTO: NEGATIVE
NRBC # BLD AUTO: 0 K/UL
NRBC BLD-RTO: 0 /100 WBC
PH UR STRIP.AUTO: 5.5 [PH] (ref 5–8)
PLATELET # BLD AUTO: 274 K/UL (ref 164–446)
PMV BLD AUTO: 10.5 FL (ref 9–12.9)
POTASSIUM SERPL-SCNC: 4.5 MMOL/L (ref 3.6–5.5)
PROT SERPL-MCNC: 6.9 G/DL (ref 6–8.2)
PROT UR QL STRIP: NEGATIVE MG/DL
RBC # BLD AUTO: 4.04 M/UL (ref 4.2–5.4)
RBC # URNS HPF: ABNORMAL /HPF
RBC UR QL AUTO: NEGATIVE
SODIUM SERPL-SCNC: 143 MMOL/L (ref 135–145)
SP GR UR STRIP.AUTO: 1.02
T3FREE SERPL-MCNC: 2.34 PG/ML (ref 2–4.4)
T4 FREE SERPL-MCNC: 1.15 NG/DL (ref 0.93–1.7)
TRIGL SERPL-MCNC: 55 MG/DL (ref 0–149)
TSH SERPL DL<=0.005 MIU/L-ACNC: 2.92 UIU/ML (ref 0.38–5.33)
UROBILINOGEN UR STRIP.AUTO-MCNC: 0.2 MG/DL
VIT B12 SERPL-MCNC: 984 PG/ML (ref 211–911)
WBC # BLD AUTO: 5.6 K/UL (ref 4.8–10.8)
WBC #/AREA URNS HPF: ABNORMAL /HPF

## 2022-11-02 PROCEDURE — 84439 ASSAY OF FREE THYROXINE: CPT

## 2022-11-02 PROCEDURE — 87086 URINE CULTURE/COLONY COUNT: CPT

## 2022-11-02 PROCEDURE — 81001 URINALYSIS AUTO W/SCOPE: CPT

## 2022-11-02 PROCEDURE — 82306 VITAMIN D 25 HYDROXY: CPT

## 2022-11-02 PROCEDURE — 82746 ASSAY OF FOLIC ACID SERUM: CPT

## 2022-11-02 PROCEDURE — 36415 COLL VENOUS BLD VENIPUNCTURE: CPT

## 2022-11-02 PROCEDURE — 80061 LIPID PANEL: CPT

## 2022-11-02 PROCEDURE — 84481 FREE ASSAY (FT-3): CPT

## 2022-11-02 PROCEDURE — 82607 VITAMIN B-12: CPT

## 2022-11-02 PROCEDURE — 85025 COMPLETE CBC W/AUTO DIFF WBC: CPT

## 2022-11-02 PROCEDURE — 80053 COMPREHEN METABOLIC PANEL: CPT

## 2022-11-02 PROCEDURE — 84443 ASSAY THYROID STIM HORMONE: CPT

## 2022-11-05 LAB
BACTERIA UR CULT: NORMAL
SIGNIFICANT IND 70042: NORMAL
SITE SITE: NORMAL
SOURCE SOURCE: NORMAL

## 2022-11-11 ENCOUNTER — PATIENT MESSAGE (OUTPATIENT)
Dept: HEALTH INFORMATION MANAGEMENT | Facility: OTHER | Age: 77
End: 2022-11-11

## 2022-12-13 ENCOUNTER — APPOINTMENT (OUTPATIENT)
Dept: RADIOLOGY | Facility: MEDICAL CENTER | Age: 77
End: 2022-12-13
Attending: FAMILY MEDICINE
Payer: MEDICARE

## 2022-12-13 DIAGNOSIS — M54.32 SCIATICA OF LEFT SIDE: ICD-10-CM

## 2022-12-13 PROCEDURE — 72148 MRI LUMBAR SPINE W/O DYE: CPT

## 2023-04-18 ENCOUNTER — HOSPITAL ENCOUNTER (OUTPATIENT)
Dept: RADIOLOGY | Facility: MEDICAL CENTER | Age: 78
End: 2023-04-18
Attending: FAMILY MEDICINE
Payer: MEDICARE

## 2023-04-18 DIAGNOSIS — J01.90 ACUTE SINUSITIS, RECURRENCE NOT SPECIFIED, UNSPECIFIED LOCATION: ICD-10-CM

## 2023-04-18 PROCEDURE — 70220 X-RAY EXAM OF SINUSES: CPT

## 2023-05-30 ENCOUNTER — APPOINTMENT (OUTPATIENT)
Dept: RADIOLOGY | Facility: MEDICAL CENTER | Age: 78
End: 2023-05-30
Payer: MEDICARE

## 2023-05-30 ENCOUNTER — HOSPITAL ENCOUNTER (OUTPATIENT)
Facility: MEDICAL CENTER | Age: 78
End: 2023-05-31
Attending: EMERGENCY MEDICINE | Admitting: HOSPITALIST
Payer: MEDICARE

## 2023-05-30 ENCOUNTER — APPOINTMENT (OUTPATIENT)
Dept: RADIOLOGY | Facility: MEDICAL CENTER | Age: 78
End: 2023-05-30
Attending: EMERGENCY MEDICINE
Payer: MEDICARE

## 2023-05-30 DIAGNOSIS — M79.10 MYALGIA: ICD-10-CM

## 2023-05-30 DIAGNOSIS — K21.9 GASTROESOPHAGEAL REFLUX DISEASE WITHOUT ESOPHAGITIS: ICD-10-CM

## 2023-05-30 DIAGNOSIS — F41.9 ANXIETY: ICD-10-CM

## 2023-05-30 DIAGNOSIS — R07.9 CHEST PAIN, UNSPECIFIED TYPE: ICD-10-CM

## 2023-05-30 DIAGNOSIS — R06.09 DYSPNEA ON EXERTION: ICD-10-CM

## 2023-05-30 LAB
ALBUMIN SERPL BCP-MCNC: 4.4 G/DL (ref 3.2–4.9)
ALBUMIN/GLOB SERPL: 1.7 G/DL
ALP SERPL-CCNC: 75 U/L (ref 30–99)
ALT SERPL-CCNC: 17 U/L (ref 2–50)
ANION GAP SERPL CALC-SCNC: 11 MMOL/L (ref 7–16)
AST SERPL-CCNC: 20 U/L (ref 12–45)
BASOPHILS # BLD AUTO: 0.6 % (ref 0–1.8)
BASOPHILS # BLD: 0.04 K/UL (ref 0–0.12)
BILIRUB SERPL-MCNC: 0.6 MG/DL (ref 0.1–1.5)
BUN SERPL-MCNC: 21 MG/DL (ref 8–22)
CALCIUM ALBUM COR SERPL-MCNC: 9.1 MG/DL (ref 8.5–10.5)
CALCIUM SERPL-MCNC: 9.4 MG/DL (ref 8.4–10.2)
CHLORIDE SERPL-SCNC: 105 MMOL/L (ref 96–112)
CO2 SERPL-SCNC: 24 MMOL/L (ref 20–33)
CREAT SERPL-MCNC: 0.83 MG/DL (ref 0.5–1.4)
D DIMER PPP IA.FEU-MCNC: 0.89 UG/ML (FEU) (ref 0–0.5)
EKG IMPRESSION: NORMAL
EOSINOPHIL # BLD AUTO: 0.07 K/UL (ref 0–0.51)
EOSINOPHIL NFR BLD: 1.1 % (ref 0–6.9)
ERYTHROCYTE [DISTWIDTH] IN BLOOD BY AUTOMATED COUNT: 47 FL (ref 35.9–50)
GFR SERPLBLD CREATININE-BSD FMLA CKD-EPI: 72 ML/MIN/1.73 M 2
GLOBULIN SER CALC-MCNC: 2.6 G/DL (ref 1.9–3.5)
GLUCOSE SERPL-MCNC: 100 MG/DL (ref 65–99)
HCT VFR BLD AUTO: 44.5 % (ref 37–47)
HGB BLD-MCNC: 14.7 G/DL (ref 12–16)
IMM GRANULOCYTES # BLD AUTO: 0.02 K/UL (ref 0–0.11)
IMM GRANULOCYTES NFR BLD AUTO: 0.3 % (ref 0–0.9)
LYMPHOCYTES # BLD AUTO: 2.17 K/UL (ref 1–4.8)
LYMPHOCYTES NFR BLD: 33.2 % (ref 22–41)
MCH RBC QN AUTO: 33 PG (ref 27–33)
MCHC RBC AUTO-ENTMCNC: 33 G/DL (ref 32.2–35.5)
MCV RBC AUTO: 100 FL (ref 81.4–97.8)
MONOCYTES # BLD AUTO: 0.71 K/UL (ref 0–0.85)
MONOCYTES NFR BLD AUTO: 10.9 % (ref 0–13.4)
NEUTROPHILS # BLD AUTO: 3.52 K/UL (ref 1.82–7.42)
NEUTROPHILS NFR BLD: 53.9 % (ref 44–72)
NRBC # BLD AUTO: 0 K/UL
NRBC BLD-RTO: 0 /100 WBC (ref 0–0.2)
NT-PROBNP SERPL IA-MCNC: 85 PG/ML (ref 0–125)
PLATELET # BLD AUTO: 258 K/UL (ref 164–446)
PMV BLD AUTO: 10.4 FL (ref 9–12.9)
POTASSIUM SERPL-SCNC: 4.5 MMOL/L (ref 3.6–5.5)
PROT SERPL-MCNC: 7 G/DL (ref 6–8.2)
RBC # BLD AUTO: 4.45 M/UL (ref 4.2–5.4)
SODIUM SERPL-SCNC: 140 MMOL/L (ref 135–145)
TROPONIN T SERPL-MCNC: 12 NG/L (ref 6–19)
TROPONIN T SERPL-MCNC: 12 NG/L (ref 6–19)
TROPONIN T SERPL-MCNC: 14 NG/L (ref 6–19)
WBC # BLD AUTO: 6.5 K/UL (ref 4.8–10.8)

## 2023-05-30 PROCEDURE — 71045 X-RAY EXAM CHEST 1 VIEW: CPT

## 2023-05-30 PROCEDURE — 84484 ASSAY OF TROPONIN QUANT: CPT

## 2023-05-30 PROCEDURE — 96372 THER/PROPH/DIAG INJ SC/IM: CPT

## 2023-05-30 PROCEDURE — 71275 CT ANGIOGRAPHY CHEST: CPT

## 2023-05-30 PROCEDURE — 700111 HCHG RX REV CODE 636 W/ 250 OVERRIDE (IP): Performed by: HOSPITALIST

## 2023-05-30 PROCEDURE — 94760 N-INVAS EAR/PLS OXIMETRY 1: CPT

## 2023-05-30 PROCEDURE — 36415 COLL VENOUS BLD VENIPUNCTURE: CPT

## 2023-05-30 PROCEDURE — 700117 HCHG RX CONTRAST REV CODE 255: Performed by: EMERGENCY MEDICINE

## 2023-05-30 PROCEDURE — A9270 NON-COVERED ITEM OR SERVICE: HCPCS | Performed by: HOSPITALIST

## 2023-05-30 PROCEDURE — 80053 COMPREHEN METABOLIC PANEL: CPT

## 2023-05-30 PROCEDURE — G0378 HOSPITAL OBSERVATION PER HR: HCPCS

## 2023-05-30 PROCEDURE — 93005 ELECTROCARDIOGRAM TRACING: CPT | Performed by: HOSPITALIST

## 2023-05-30 PROCEDURE — 700102 HCHG RX REV CODE 250 W/ 637 OVERRIDE(OP): Performed by: HOSPITALIST

## 2023-05-30 PROCEDURE — 85025 COMPLETE CBC W/AUTO DIFF WBC: CPT

## 2023-05-30 PROCEDURE — 99285 EMERGENCY DEPT VISIT HI MDM: CPT

## 2023-05-30 PROCEDURE — 93005 ELECTROCARDIOGRAM TRACING: CPT | Performed by: EMERGENCY MEDICINE

## 2023-05-30 PROCEDURE — 85379 FIBRIN DEGRADATION QUANT: CPT

## 2023-05-30 PROCEDURE — 83880 ASSAY OF NATRIURETIC PEPTIDE: CPT

## 2023-05-30 PROCEDURE — 99223 1ST HOSP IP/OBS HIGH 75: CPT | Mod: AI | Performed by: HOSPITALIST

## 2023-05-30 RX ORDER — LISINOPRIL 5 MG/1
10 TABLET ORAL DAILY
Status: DISCONTINUED | OUTPATIENT
Start: 2023-05-31 | End: 2023-05-31 | Stop reason: HOSPADM

## 2023-05-30 RX ORDER — LABETALOL HYDROCHLORIDE 5 MG/ML
10 INJECTION, SOLUTION INTRAVENOUS EVERY 4 HOURS PRN
Status: DISCONTINUED | OUTPATIENT
Start: 2023-05-30 | End: 2023-05-31 | Stop reason: HOSPADM

## 2023-05-30 RX ORDER — ONDANSETRON 4 MG/1
4 TABLET, ORALLY DISINTEGRATING ORAL EVERY 4 HOURS PRN
Status: DISCONTINUED | OUTPATIENT
Start: 2023-05-30 | End: 2023-05-31 | Stop reason: HOSPADM

## 2023-05-30 RX ORDER — POLYETHYLENE GLYCOL 3350 17 G/17G
1 POWDER, FOR SOLUTION ORAL
Status: DISCONTINUED | OUTPATIENT
Start: 2023-05-30 | End: 2023-05-31 | Stop reason: HOSPADM

## 2023-05-30 RX ORDER — AMOXICILLIN 250 MG
2 CAPSULE ORAL 2 TIMES DAILY
Status: DISCONTINUED | OUTPATIENT
Start: 2023-05-30 | End: 2023-05-31 | Stop reason: HOSPADM

## 2023-05-30 RX ORDER — LEVOTHYROXINE SODIUM 0.05 MG/1
50 TABLET ORAL
Status: DISCONTINUED | OUTPATIENT
Start: 2023-06-01 | End: 2023-05-31 | Stop reason: HOSPADM

## 2023-05-30 RX ORDER — ENOXAPARIN SODIUM 100 MG/ML
40 INJECTION SUBCUTANEOUS DAILY
Status: DISCONTINUED | OUTPATIENT
Start: 2023-05-30 | End: 2023-05-31 | Stop reason: HOSPADM

## 2023-05-30 RX ORDER — NITROGLYCERIN 0.4 MG/1
0.4 TABLET SUBLINGUAL
Status: DISCONTINUED | OUTPATIENT
Start: 2023-05-30 | End: 2023-05-31 | Stop reason: HOSPADM

## 2023-05-30 RX ORDER — ASPIRIN 325 MG
325 TABLET ORAL DAILY
Status: DISCONTINUED | OUTPATIENT
Start: 2023-05-30 | End: 2023-05-31 | Stop reason: HOSPADM

## 2023-05-30 RX ORDER — LEVOTHYROXINE SODIUM 0.07 MG/1
75 TABLET ORAL
Status: DISCONTINUED | OUTPATIENT
Start: 2023-05-31 | End: 2023-05-31 | Stop reason: HOSPADM

## 2023-05-30 RX ORDER — ASPIRIN 81 MG/1
324 TABLET, CHEWABLE ORAL DAILY
Status: DISCONTINUED | OUTPATIENT
Start: 2023-05-30 | End: 2023-05-31 | Stop reason: HOSPADM

## 2023-05-30 RX ORDER — BISACODYL 10 MG
10 SUPPOSITORY, RECTAL RECTAL
Status: DISCONTINUED | OUTPATIENT
Start: 2023-05-30 | End: 2023-05-31 | Stop reason: HOSPADM

## 2023-05-30 RX ORDER — ASPIRIN 300 MG/1
300 SUPPOSITORY RECTAL DAILY
Status: DISCONTINUED | OUTPATIENT
Start: 2023-05-30 | End: 2023-05-31 | Stop reason: HOSPADM

## 2023-05-30 RX ORDER — PRAVASTATIN SODIUM 20 MG
20 TABLET ORAL NIGHTLY
Status: DISCONTINUED | OUTPATIENT
Start: 2023-05-30 | End: 2023-05-31 | Stop reason: HOSPADM

## 2023-05-30 RX ORDER — ONDANSETRON 2 MG/ML
4 INJECTION INTRAMUSCULAR; INTRAVENOUS EVERY 4 HOURS PRN
Status: DISCONTINUED | OUTPATIENT
Start: 2023-05-30 | End: 2023-05-31 | Stop reason: HOSPADM

## 2023-05-30 RX ORDER — ACETAMINOPHEN 325 MG/1
650 TABLET ORAL EVERY 6 HOURS PRN
Status: DISCONTINUED | OUTPATIENT
Start: 2023-05-30 | End: 2023-05-31 | Stop reason: HOSPADM

## 2023-05-30 RX ADMIN — SENNOSIDES AND DOCUSATE SODIUM 2 TABLET: 50; 8.6 TABLET ORAL at 17:23

## 2023-05-30 RX ADMIN — IOHEXOL 80 ML: 350 INJECTION, SOLUTION INTRAVENOUS at 13:35

## 2023-05-30 RX ADMIN — LIDOCAINE HYDROCHLORIDE 15 ML: 20 SOLUTION OROPHARYNGEAL at 15:52

## 2023-05-30 RX ADMIN — ASPIRIN 325 MG: 325 TABLET ORAL at 15:52

## 2023-05-30 RX ADMIN — ENOXAPARIN SODIUM 40 MG: 40 INJECTION SUBCUTANEOUS at 17:23

## 2023-05-30 ASSESSMENT — COGNITIVE AND FUNCTIONAL STATUS - GENERAL
SUGGESTED CMS G CODE MODIFIER MOBILITY: CH
MOBILITY SCORE: 24
DAILY ACTIVITIY SCORE: 24
SUGGESTED CMS G CODE MODIFIER DAILY ACTIVITY: CH

## 2023-05-30 ASSESSMENT — LIFESTYLE VARIABLES
TOTAL SCORE: 0
HOW MANY TIMES IN THE PAST YEAR HAVE YOU HAD 5 OR MORE DRINKS IN A DAY: 0
AVERAGE NUMBER OF DAYS PER WEEK YOU HAVE A DRINK CONTAINING ALCOHOL: 2
HAVE PEOPLE ANNOYED YOU BY CRITICIZING YOUR DRINKING: NO
ON A TYPICAL DAY WHEN YOU DRINK ALCOHOL HOW MANY DRINKS DO YOU HAVE: 1
ALCOHOL_USE: YES
TOTAL SCORE: 0
HAVE YOU EVER FELT YOU SHOULD CUT DOWN ON YOUR DRINKING: NO
EVER FELT BAD OR GUILTY ABOUT YOUR DRINKING: NO
CONSUMPTION TOTAL: NEGATIVE
TOTAL SCORE: 0
EVER HAD A DRINK FIRST THING IN THE MORNING TO STEADY YOUR NERVES TO GET RID OF A HANGOVER: NO

## 2023-05-30 ASSESSMENT — ENCOUNTER SYMPTOMS
HEADACHES: 0
BLOOD IN STOOL: 0
FOCAL WEAKNESS: 0
DOUBLE VISION: 0
CONSTIPATION: 0
COUGH: 0
LOSS OF CONSCIOUSNESS: 0
HEARTBURN: 0
DIAPHORESIS: 0
FEVER: 0
NAUSEA: 1
BRUISES/BLEEDS EASILY: 0
RESPIRATORY NEGATIVE: 1
VOMITING: 0
NERVOUS/ANXIOUS: 0
MYALGIAS: 1
SHORTNESS OF BREATH: 0
PALPITATIONS: 0
CHILLS: 0
WHEEZING: 0
SEIZURES: 0
HEMOPTYSIS: 0
ABDOMINAL PAIN: 0
EYES NEGATIVE: 1
DIZZINESS: 0
PSYCHIATRIC NEGATIVE: 1
NEUROLOGICAL NEGATIVE: 1
DIARRHEA: 0

## 2023-05-30 ASSESSMENT — FIBROSIS 4 INDEX
FIB4 SCORE: 1.49
FIB4 SCORE: 1.32

## 2023-05-30 ASSESSMENT — PATIENT HEALTH QUESTIONNAIRE - PHQ9
SUM OF ALL RESPONSES TO PHQ9 QUESTIONS 1 AND 2: 0
2. FEELING DOWN, DEPRESSED, IRRITABLE, OR HOPELESS: SEVERAL DAYS
2. FEELING DOWN, DEPRESSED, IRRITABLE, OR HOPELESS: NOT AT ALL
1. LITTLE INTEREST OR PLEASURE IN DOING THINGS: NOT AT ALL
1. LITTLE INTEREST OR PLEASURE IN DOING THINGS: NOT AT ALL
SUM OF ALL RESPONSES TO PHQ9 QUESTIONS 1 AND 2: 1

## 2023-05-30 ASSESSMENT — HEART SCORE
ECG: NORMAL
TROPONIN: LESS THAN OR EQUAL TO NORMAL LIMIT
HISTORY: HIGHLY SUSPICIOUS
RISK FACTORS: 1-2 RISK FACTORS
AGE: 65+
HEART SCORE: 5

## 2023-05-30 ASSESSMENT — PAIN DESCRIPTION - PAIN TYPE: TYPE: ACUTE PAIN

## 2023-05-30 ASSESSMENT — VISUAL ACUITY: OU: 1

## 2023-05-30 NOTE — ED TRIAGE NOTES
Chief Complaint   Patient presents with    Chest Pain     1 month of tightness in her chest with SOB, she also report left neck pain, + nausea. She has been seen by PCP where she has had normal EKG's in the past. Sent today by PCP.

## 2023-05-30 NOTE — ASSESSMENT & PLAN NOTE
Continue with Synthroid supplementation and 50 mcg every other day and then 75 mcg every other day alternating  Most recent TSH is 2.920

## 2023-05-30 NOTE — H&P
Hospital Medicine History & Physical Note    Date of Service  5/30/2023    Primary Care Physician  Valeria Bautista M.D.    Consultants  None    Specialist Names: None    Code Status  Full Code    Chief Complaint  Chief Complaint   Patient presents with    Chest Pain     1 month of tightness in her chest with SOB, she also report left neck pain, + nausea. She has been seen by PCP where she has had normal EKG's in the past. Sent today by PCP.       History of Presenting Illness  Sophia Leahy is a 78 y.o. female who presented 5/30/2023 with chest pain.  Patient reports worsening chest pain that started yesterday evening.  The pain today became a 10 out of 10.  She has been having chest tightness for the past month on and off.  She says it comes on with activity.  The patient reports that with the activity it goes through her chest into her back and down her arm.  She also had nausea with no diaphoresis no shortness of breath though.  The patient at this point will be admitted to the telemetry [facility] unit under telemetric monitoring she will undergo 3 sets of cardiac markers followed by a stress test in the morning.  Patient at this point has been explained the consequences of having a positive or negative stress test.    I discussed the plan of care with patient, family, bedside RN, and emergency room physician .    Review of Systems  Review of Systems   Constitutional:  Negative for chills, diaphoresis and fever.   HENT: Negative.     Eyes: Negative.  Negative for double vision.   Respiratory: Negative.  Negative for cough, hemoptysis, shortness of breath and wheezing.    Cardiovascular:  Positive for chest pain (Pressure in sensation). Negative for palpitations and leg swelling.   Gastrointestinal:  Positive for nausea. Negative for abdominal pain, blood in stool, constipation, diarrhea, heartburn and vomiting.   Genitourinary: Negative.  Negative for frequency, hematuria and urgency.   Musculoskeletal:   Positive for myalgias (The chest pressure radiated into her back and down her arm). Negative for joint pain.   Skin: Negative.  Negative for itching and rash.   Neurological: Negative.  Negative for dizziness, focal weakness, seizures, loss of consciousness and headaches.   Endo/Heme/Allergies: Negative.  Does not bruise/bleed easily.   Psychiatric/Behavioral: Negative.  Negative for suicidal ideas. The patient is not nervous/anxious.    All other systems reviewed and are negative.      Past Medical History   has a past medical history of Anesthesia (02/22/2022), Bowel habit changes (02/22/2022), High cholesterol, Hypertension, Thyroid disorder, and Urinary bladder disorder (02/22/2022).    Surgical History   has a past surgical history that includes abdominal hysterectomy total; lumpectomy; cataract extraction with iol; other orthopedic surgery (Right); pr combined ant/post colporrhaphy (3/8/2022); and pr cystourethroscopy (N/A, 3/8/2022).     Family History  There is strong family history of coronary artery disease.  Family history reviewed with patient. There is family history that is pertinent to the chief complaint.     Social History   reports that she has never smoked. She has never used smokeless tobacco. She reports current alcohol use. She reports that she does not use drugs.    Allergies  Allergies   Allergen Reactions    Pcn [Penicillins] Hives and Swelling    Sulfa Drugs Nausea     As a young adult         Medications  Prior to Admission Medications   Prescriptions Last Dose Informant Patient Reported? Taking?   Ascorbic Acid (VITAMIN C PO) 5/29/2023 at 1000 Patient Yes No   Sig: Take 1 Tablet by mouth every day.   Aspirin-Acetaminophen-Caffeine (EXCEDRIN PO) 5/29/2023 at 1700 Patient Yes Yes   Sig: Take 1 Tablet by mouth 2 times a day as needed (For headache).   Cholecalciferol (VITAMIN D3 PO) 5/29/2023 at 1000 Patient Yes No   Sig: Take 1 Capful by mouth every day.   Cyanocobalamin (B-12 PO) 5/29/2023  at 1000 Patient Yes Yes   Sig: Take 1 Tablet by mouth every day.   L-LYSINE HCL PO 5/29/2023 at 1000 Patient Yes No   Sig: Take 1 Tab by mouth every day.   Multiple Vitamins-Minerals (ZINC PO) 5/29/2023 at 1000 Patient Yes No   Sig: Take 1 Tablet by mouth every day.   levothyroxine (SYNTHROID) 50 MCG TABS 5/30/2023 at 0700 Patient Yes No   Sig: Take 50 mcg by mouth every 48 hours. Pt took on 5/30/2023, pt also has an RX for 75MCG that she alternates with her 50MCG   levothyroxine (SYNTHROID) 75 MCG Tab 5/29/2023 at 0700 Patient Yes No   Sig: Take 75 mcg by mouth every 48 hours. Pt took on 5/29/2023,  pt also has an RX for 50MCG that she alternates with her 75MCG   lisinopril (PRINIVIL) 10 MG Tab 5/30/2023 at 0700 Patient Yes No   Sig: 10 mg every day.   pravastatin (PRAVACHOL) 20 MG TABS > 1 month at STOPPED Patient Yes No   Sig: Take 20 mg by mouth every evening.      Facility-Administered Medications: None       Physical Exam  Temp:  [36.1 °C (97 °F)-36.8 °C (98.3 °F)] 36.8 °C (98.3 °F)  Pulse:  [61-72] 67  Resp:  [15-18] 18  BP: (125-194)/(48-78) 157/48  SpO2:  [94 %-98 %] 97 %  Blood Pressure : (!) 157/48   Temperature: 36.8 °C (98.3 °F)   Pulse: 67   Respiration: 18   Pulse Oximetry: 97 %       Physical Exam  Vitals and nursing note reviewed. Exam conducted with a chaperone present.   Constitutional:       General: She is awake.      Appearance: Normal appearance. She is well-developed and well-groomed. She is obese. She is not diaphoretic.   HENT:      Head: Normocephalic and atraumatic.      Jaw: There is normal jaw occlusion. No trismus.      Salivary Glands: Right salivary gland is not tender. Left salivary gland is not tender.      Right Ear: External ear normal.      Left Ear: External ear normal.      Nose: Nose normal. No congestion or rhinorrhea.      Mouth/Throat:      Mouth: Mucous membranes are moist.      Pharynx: Oropharynx is clear. No oropharyngeal exudate or posterior oropharyngeal erythema.    Eyes:      General: Lids are normal. Vision grossly intact.      Extraocular Movements: Extraocular movements intact.      Conjunctiva/sclera: Conjunctivae normal.      Right eye: Right conjunctiva is not injected. No exudate.     Left eye: Left conjunctiva is not injected. No exudate.     Pupils: Pupils are equal, round, and reactive to light.   Neck:      Thyroid: No thyroid mass.      Vascular: No hepatojugular reflux or JVD.      Trachea: No abnormal tracheal secretions or tracheal deviation.   Cardiovascular:      Rate and Rhythm: Normal rate and regular rhythm. Occasional Extrasystoles are present.     Pulses: Normal pulses.      Heart sounds: Normal heart sounds. No murmur heard.     No friction rub.   Pulmonary:      Effort: Pulmonary effort is normal.      Breath sounds: Examination of the right-lower field reveals decreased breath sounds. Examination of the left-lower field reveals decreased breath sounds. Decreased breath sounds present. No wheezing or rhonchi.   Abdominal:      General: Abdomen is flat.      Palpations: Abdomen is soft.      Tenderness: There is no abdominal tenderness. There is no right CVA tenderness or left CVA tenderness.      Hernia: No hernia is present.   Musculoskeletal:         General: Normal range of motion.      Cervical back: Full passive range of motion without pain, normal range of motion and neck supple. No rigidity. No muscular tenderness.      Right lower leg: No edema.      Left lower leg: No edema.   Lymphadenopathy:      Head:      Right side of head: No submental adenopathy.      Left side of head: No submental adenopathy.      Cervical:      Right cervical: No superficial cervical adenopathy.     Left cervical: No superficial cervical adenopathy.      Upper Body:      Right upper body: No supraclavicular adenopathy.      Left upper body: No supraclavicular adenopathy.   Skin:     General: Skin is warm and dry.      Capillary Refill: Capillary refill takes less  than 2 seconds.      Coloration: Skin is not cyanotic or pale.      Findings: No abrasion or bruising.   Neurological:      General: No focal deficit present.      Mental Status: She is alert and oriented to person, place, and time. Mental status is at baseline.      GCS: GCS eye subscore is 4. GCS verbal subscore is 5. GCS motor subscore is 6.      Cranial Nerves: No cranial nerve deficit.      Sensory: No sensory deficit.      Motor: Motor function is intact.      Deep Tendon Reflexes:      Reflex Scores:       Tricep reflexes are 2+ on the right side and 2+ on the left side.       Bicep reflexes are 2+ on the right side and 2+ on the left side.       Brachioradialis reflexes are 2+ on the right side and 2+ on the left side.       Patellar reflexes are 2+ on the right side and 2+ on the left side.       Achilles reflexes are 2+ on the right side and 2+ on the left side.  Psychiatric:         Attention and Perception: Attention and perception normal.         Mood and Affect: Mood normal.         Speech: Speech normal.         Behavior: Behavior normal. Behavior is cooperative.         Thought Content: Thought content normal.         Cognition and Memory: Cognition and memory normal.         Judgment: Judgment normal.         Laboratory:  Recent Labs     05/30/23  1025   WBC 6.5   RBC 4.45   HEMOGLOBIN 14.7   HEMATOCRIT 44.5   .0*   MCH 33.0   MCHC 33.0   RDW 47.0   PLATELETCT 258   MPV 10.4     Recent Labs     05/30/23  1025   SODIUM 140   POTASSIUM 4.5   CHLORIDE 105   CO2 24   GLUCOSE 100*   BUN 21   CREATININE 0.83   CALCIUM 9.4     Recent Labs     05/30/23  1025   ALTSGPT 17   ASTSGOT 20   ALKPHOSPHAT 75   TBILIRUBIN 0.6   GLUCOSE 100*         Recent Labs     05/30/23  1025   NTPROBNP 85         Recent Labs     05/30/23  1025 05/30/23  1246   TROPONINT 14 12       Imaging:  CT-CTA CHEST PULMONARY ARTERY W/ RECONS   Final Result         No evidence of pulmonary embolism or acute abnormality.       Fleischner Society pulmonary nodule recommendations:   Not applicable      DX-CHEST-PORTABLE (1 VIEW)   Final Result      No evidence of acute cardiopulmonary process.      NM-CARDIAC TREADMILL ONLY-NO IMAGING    (Results Pending)       X-Ray:  I have personally reviewed the images and compared with prior images.  EKG:  I have personally reviewed the images and compared with prior images.    Assessment/Plan:  Justification for Admission Status  I anticipate this patient is appropriate for observation status at this time because patient at this point is having chest pressure that will need overnight evaluation    Patient will need a Telemetry bed on MEDICAL service .  The need is secondary to chest pain.    * Chest pain- (present on admission)  Assessment & Plan  The 10-year ASCVD risk score (Kourtney LORENZO, et al., 2019) is: 35%    Values used to calculate the score:      Age: 78 years      Sex: Female      Is Non- : No      Diabetic: No      Tobacco smoker: No      Systolic Blood Pressure: 157 mmHg      Is BP treated: No      HDL Cholesterol: 71 mg/dL      Total Cholesterol: 196 mg/dL   Patient states that the chest pain suddenly became severe last night and she says it is really not a chest pain is more chest pressure.  It then came back today again with activity.  Apparently she has had on and off chest pressure over the past month but its really gotten severe today.  It became a 10 out of 10 and is why she came into the emergency room.  At this point patient last time had some caffeinated chocolate yesterday around 4 PM.  The patient at this point will be able to undergo serial cardiac marker evaluations  Stress test is planned for the morning  If the stress test is positive she will need a coronary angiography if it is negative patient will need to look for other causes of her chest pain      Hypothyroidism (acquired)- (present on admission)  Assessment & Plan  Continue with Synthroid  supplementation and 50 mcg every other day and then 75 mcg every other day alternating  Most recent TSH is 2.920    Hypercholesteremia- (present on admission)  Assessment & Plan  Low-fat low-cholesterol diet  Stat  Fasting lipid panel        VTE prophylaxis: SCDs/TEDs

## 2023-05-30 NOTE — ED NOTES
Repeat Troponin collected & sent to lab, Pt denied having any needs at this time, no distress noted;

## 2023-05-30 NOTE — PROGRESS NOTES
4 Eyes Skin Assessment Completed by CELESTE Jacobo and Denise CARDENAS RN.    Head WDL  Ears WDL  Nose WDL  Mouth WDL  Neck WDL  Breast/Chest WDL  Shoulder Blades WDL  Spine WDL  (R) Arm/Elbow/Hand WDL  (L) Arm/Elbow/Hand WDL  Abdomen WDL  Groin WDL  Scrotum/Coccyx/Buttocks WDL  (R) Leg WDL  (L) Leg WDL  (R) Heel/Foot/Toe WDL  (L) Heel/Foot/Toe WDL          Devices In Places Tele Box      Interventions In Place Pillows    Possible Skin Injury No    Pictures Uploaded Into Epic N/A  Wound Consult Placed N/A  RN Wound Prevention Protocol Ordered No

## 2023-05-30 NOTE — ED PROVIDER NOTES
Emergency Physician Note    Chief Concern:  Chief Complaint   Patient presents with    Chest Pain     1 month of tightness in her chest with SOB, she also report left neck pain, + nausea. She has been seen by PCP where she has had normal EKG's in the past. Sent today by PCP.         Limitation to History:  Select: : None    HPI/ROS   Outside Historians:   None     External Records Reviewed:  Care everywhere Ms. Kapadia was seen in cardiology clinic 1/6/2020.  Cardiologist note reviewed from that visit.  She is noted have a history of hyperlipidemia and hypothyroidism, no history of diabetes, hypertension, smoking, or family history of premature coronary disease.  No exertional induced chest pain.  She did describe some shortness of breath with activity at that time.  Plan was for outpatient echocardiogram.  She was treated with a statin for hyperlipidemia.    HPI:  Sophia Leahy is a 78 y.o. female who presents to the emergency department today for evaluation of exertional chest pain.  She states her symptoms began about a month ago, she noticed that whenever she would exert herself either walking, or through chores around the house such as making the bed she would develop chest tightness and shortness of breath.  She would have to rest until the symptoms improved, and she describes some frustration that she could not simply make the bed without being interrupted by chest tightness.  She did see her primary care physician for this when it began who performed an EKG that was reassuring.  She had also seen a cardiologist years ago for some similar symptoms, had a reassuring echocardiogram, did not require any ongoing follow-up.  Over the past month, she states her symptoms are not necessarily worsening in frequency or severity, however they are reliably reproducible with exertion.  She has had no recent cough, no shortness of breath, no fevers.  She reports no leg pain or leg swelling.  She reports no prior  history of DVT nor pulmonary embolism, no prior history of coronary artery disease.    PAST MEDICAL HISTORY  Past Medical History:   Diagnosis Date    Anesthesia 2022    sister  from halothane    Bowel habit changes 2022    constipation    High cholesterol     Hypertension     Thyroid disorder     Urinary bladder disorder 2022    prolapsed bladder       SURGICAL HISTORY  Past Surgical History:   Procedure Laterality Date    MD COMBINED ANT/POST COLPORRHAPHY  3/8/2022    Procedure: COLPORRHAPHY, COMBINED ANTEROPOSTERIOR;  Surgeon: Joyce Alvarez D.O.;  Location: SURGERY [facility];  Service: Obstetrics    MD CYSTOURETHROSCOPY N/A 3/8/2022    Procedure: CYSTOSCOPY;  Surgeon: Joyce Alvarez D.O.;  Location: SURGERY [facility];  Service: Obstetrics    ABDOMINAL HYSTERECTOMY TOTAL      CATARACT EXTRACTION WITH IOL      LUMPECTOMY      OTHER ORTHOPEDIC SURGERY Right     knee       FAMILY HISTORY  No family history on file.    SOCIAL HISTORY   reports that she has never smoked. She has never used smokeless tobacco. She reports current alcohol use. She reports that she does not use drugs.    CURRENT MEDICATIONS  Previous Medications    ASCORBIC ACID (VITAMIN C PO)    Take 1 Tablet by mouth every day.    ASPIRIN-ACETAMINOPHEN-CAFFEINE (EXCEDRIN PO)    Take 1 Tablet by mouth 2 times a day as needed (For headache).    CHOLECALCIFEROL (VITAMIN D3 PO)    Take 1 Capful by mouth every day.    CYANOCOBALAMIN (B-12 PO)    Take 1 Tablet by mouth every day.    L-LYSINE HCL PO    Take 1 Tab by mouth every day.    LEVOTHYROXINE (SYNTHROID) 50 MCG TABS    Take 50 mcg by mouth every 48 hours. Pt took on 2023, pt also has an RX for 75MCG that she alternates with her 50MCG    LEVOTHYROXINE (SYNTHROID) 75 MCG TAB    Take 75 mcg by mouth every 48 hours. Pt took on 2023,  pt also has an RX for 50MCG that she alternates with her 75MCG    LISINOPRIL (PRINIVIL) 10 MG TAB    10 mg every day.     "MULTIPLE VITAMINS-MINERALS (ZINC PO)    Take 1 Tablet by mouth every day.    PRAVASTATIN (PRAVACHOL) 20 MG TABS    Take 20 mg by mouth every evening.       ALLERGIES  Pcn [penicillins] and Sulfa drugs    PHYSICAL EXAM  Vital Signs: /60   Pulse 67   Temp 36.1 °C (97 °F) (Temporal)   Resp 18   Ht 1.626 m (5' 4\")   Wt 90.7 kg (199 lb 15.3 oz)   SpO2 94%   BMI 34.32 kg/m²   Constitutional: Alert, no acute distress  HENT: Normocephalic, atraumatic.  Cardiovascular: Regular rate and rhythm, no murmur appreciated, no tachycardia  Pulmonary: Breath sounds clear and equal bilaterally, normal work of breathing, no accessory muscle usage, room air oxygen saturation is 97%  Abdomen: Soft, non tender, no peritoneal signs.  Skin: Warm, dry, no rashes or lesions  Musculoskeletal:  No unilateral lower extremity edema, trace pretibial edema, no posterior calf or posterior thigh tenderness to palpation bilaterally  Neurologic: Alert, oriented, normal motor function, no speech deficits  Psychiatric: Normal and appropriate mood and affect    Diagnostic Studies & Procedures    Labs:  All labs reviewed by me as noted below.    EK Lead EKG interpreted by me as noted below  Rate 68, sinus rhythm, no ST elevation, T wave inversions present in V1 and V2 with T wave flattening in V3, inversions are more pronounced as compared to prior EKG which has T wave flattening in V1 through V3.  No ectopy.    Radiology:  The attending Emergency Physician has independently interpreted the following imaging:  I independently interpreted the chest x-ray, I do not appreciate any opacity suggestive of [place] hump or [place] sign.    CT-CTA CHEST PULMONARY ARTERY W/ RECONS   Final Result         No evidence of pulmonary embolism or acute abnormality.      Fleischner Society pulmonary nodule recommendations:   Not applicable      DX-CHEST-PORTABLE (1 VIEW)   Final Result      No evidence of acute cardiopulmonary process.        Course " and Medical Decision Making    ED Observation Status? No; Patient does not meet criteria for ED Observation.     Initial Assessment and Plan  Care Narrative:     Ms. Choi presents to the emergency department today for evaluation of exertional chest pain ongoing for the past month.  Symptoms are concerning for stable angina.  She states her most recent episode was yesterday, within the past 24 hours.  She has had no episodes of chest pain within the past 8 hours.  On arrival she is asymptomatic, vital signs are reassuring, she has no tachycardia, no hypotension.  Room air oxygen saturation is 97%.  Pulmonary embolism is also a consideration given chest tightness and shortness of breath with exertion, though I would expect symptoms to progressively worsen over the past month if this was the case.    On laboratory evaluation high-sensitivity troponin is negative, proBNP is within normal limits, this is less concerning for heart failure exacerbation.  No evidence of active cardial injury.  CBC and CMP are reassuring.  Screening D-dimer is elevated.  Given significant dyspnea on exertion, tachycardia, in setting of elevated D-dimer, CTA was ordered to evaluate for evidence of pulmonary edema.  Repeat troponin remained negative.    Chest x-ray is negative for acute process.  CTA demonstrates no evidence of pulmonary embolism or acute abnormality.    At this time, etiology of her exertional chest pain is unclear.  She did have a nuclear medicine stress test over a year ago, however her symptoms now are much more severe and occur reliably with exertion.  Heart score is 5 indicating moderate risk of major adverse cardiac event.  Plan at this time is for admission to CDU for continued cardiac monitoring, and stress echocardiogram.    Additional Problems and Disposition    I have discussed management of the patient with the following physician:  Dr. Clifton, Hospitalist    Decision tools considered: HEART score of  5    Disposition:  Admit to hospitalist in guarded condition    FINAL IMPRESSION   1. Chest pain, unspecified type    2. Dyspnea on exertion         IAkanksha (Scribe), am scribing for, and in the presence of, Luna Montoya M.D..    Electronically signed by: Akanksha Bryan (Scribe), 5/30/2023    ILuna M.D. personally performed the services described in this documentation, as scribed by Akanksha Bryan in my presence, and it is both accurate and complete.    The note accurately reflects work and decisions made by me.  Luna Montoya M.D.  5/30/2023  4:00 PM

## 2023-05-30 NOTE — ED NOTES
Med rec updated and complete, per pt   Allergies reviewed, per pt  Interviewed pt with  at bedside with permission from pt.  Pt reports that she stopped taking her PRAVASTATIN 20MG about a month ago.

## 2023-05-30 NOTE — ASSESSMENT & PLAN NOTE
The 10-year ASCVD risk score (Kourtney LORENZO, et al., 2019) is: 35%    Values used to calculate the score:      Age: 78 years      Sex: Female      Is Non- : No      Diabetic: No      Tobacco smoker: No      Systolic Blood Pressure: 157 mmHg      Is BP treated: No      HDL Cholesterol: 71 mg/dL      Total Cholesterol: 196 mg/dL   Patient states that the chest pain suddenly became severe last night and she says it is really not a chest pain is more chest pressure.  It then came back today again with activity.  Apparently she has had on and off chest pressure over the past month but its really gotten severe today.  It became a 10 out of 10 and is why she came into the emergency room.  At this point patient last time had some caffeinated chocolate yesterday around 4 PM.  The patient at this point will be able to undergo serial cardiac marker evaluations  Stress test is planned for the morning  If the stress test is positive she will need a coronary angiography if it is negative patient will need to look for other causes of her chest pain

## 2023-05-30 NOTE — ED NOTES
PIV placed, blood work collected & sent to lab, Pt denied having any needs at this time, no distress noted;

## 2023-05-31 ENCOUNTER — APPOINTMENT (OUTPATIENT)
Dept: RADIOLOGY | Facility: MEDICAL CENTER | Age: 78
End: 2023-05-31
Attending: INTERNAL MEDICINE
Payer: MEDICARE

## 2023-05-31 VITALS
WEIGHT: 201.94 LBS | DIASTOLIC BLOOD PRESSURE: 66 MMHG | HEART RATE: 69 BPM | RESPIRATION RATE: 18 BRPM | HEIGHT: 64 IN | OXYGEN SATURATION: 95 % | TEMPERATURE: 97.8 F | SYSTOLIC BLOOD PRESSURE: 145 MMHG | BODY MASS INDEX: 34.48 KG/M2

## 2023-05-31 LAB
CHOLEST SERPL-MCNC: 249 MG/DL (ref 100–199)
EKG IMPRESSION: NORMAL
HDLC SERPL-MCNC: 69 MG/DL
LDLC SERPL CALC-MCNC: 158 MG/DL
TRIGL SERPL-MCNC: 112 MG/DL (ref 0–149)
TROPONIN T SERPL-MCNC: 13 NG/L (ref 6–19)
TROPONIN T SERPL-MCNC: 18 NG/L (ref 6–19)

## 2023-05-31 PROCEDURE — 36415 COLL VENOUS BLD VENIPUNCTURE: CPT

## 2023-05-31 PROCEDURE — G0378 HOSPITAL OBSERVATION PER HR: HCPCS

## 2023-05-31 PROCEDURE — 93018 CV STRESS TEST I&R ONLY: CPT | Performed by: INTERNAL MEDICINE

## 2023-05-31 PROCEDURE — 94760 N-INVAS EAR/PLS OXIMETRY 1: CPT

## 2023-05-31 PROCEDURE — A9270 NON-COVERED ITEM OR SERVICE: HCPCS | Performed by: HOSPITALIST

## 2023-05-31 PROCEDURE — 99239 HOSP IP/OBS DSCHRG MGMT >30: CPT | Performed by: INTERNAL MEDICINE

## 2023-05-31 PROCEDURE — 700102 HCHG RX REV CODE 250 W/ 637 OVERRIDE(OP): Performed by: HOSPITALIST

## 2023-05-31 PROCEDURE — 80061 LIPID PANEL: CPT

## 2023-05-31 PROCEDURE — 84484 ASSAY OF TROPONIN QUANT: CPT | Mod: 91

## 2023-05-31 PROCEDURE — 78452 HT MUSCLE IMAGE SPECT MULT: CPT | Mod: 26 | Performed by: INTERNAL MEDICINE

## 2023-05-31 PROCEDURE — 78452 HT MUSCLE IMAGE SPECT MULT: CPT

## 2023-05-31 PROCEDURE — 93010 ELECTROCARDIOGRAM REPORT: CPT | Performed by: INTERNAL MEDICINE

## 2023-05-31 RX ORDER — ALPRAZOLAM 0.25 MG/1
0.25 TABLET ORAL 3 TIMES DAILY PRN
Qty: 20 TABLET | Refills: 0 | Status: SHIPPED | OUTPATIENT
Start: 2023-05-31 | End: 2023-06-07

## 2023-05-31 RX ORDER — OMEPRAZOLE 20 MG/1
20 CAPSULE, DELAYED RELEASE ORAL DAILY
Qty: 30 CAPSULE | Refills: 2 | Status: SHIPPED
Start: 2023-05-31 | End: 2023-09-27

## 2023-05-31 RX ORDER — UBIDECARENONE 100 MG
100 CAPSULE ORAL DAILY
Qty: 30 CAPSULE | COMMUNITY
Start: 2023-05-31

## 2023-05-31 RX ADMIN — ASPIRIN 325 MG: 325 TABLET ORAL at 06:37

## 2023-05-31 RX ADMIN — LISINOPRIL 10 MG: 5 TABLET ORAL at 06:38

## 2023-05-31 RX ADMIN — LEVOTHYROXINE SODIUM 75 MCG: 0.07 TABLET ORAL at 06:38

## 2023-05-31 ASSESSMENT — PAIN DESCRIPTION - PAIN TYPE: TYPE: ACUTE PAIN

## 2023-05-31 ASSESSMENT — FIBROSIS 4 INDEX: FIB4 SCORE: 1.49

## 2023-05-31 NOTE — DISCHARGE SUMMARY
Discharge Summary    CHIEF COMPLAINT ON ADMISSION  Chief Complaint   Patient presents with    Chest Pain     1 month of tightness in her chest with SOB, she also report left neck pain, + nausea. She has been seen by PCP where she has had normal EKG's in the past. Sent today by PCP.       Reason for Admission  Chest Pressure, Neck Pain, SOB     Admission Date  5/30/2023    CODE STATUS  Full Code    HPI & HOSPITAL COURSE  Patient is a 78-year-old female who presented with chest pain she has been having intermittent chest pressure for many years but for the last 2 weeks has been having increased in severity.  Comes on with activity goes from her chest into her back and up to her jaw but feels like a pressure-like sensation she would not call it pain.  She was monitored on the telemetry unit without any evidence of arrhythmia.  She had 3 troponin test which remained less than 20.  She does have elevation in her total cholesterol at 245 and her LDL at 158 but she is already on statin medication.  She underwent treadmill nuclear stress test which showed no evidence of inducible ischemia or jeopardized myocardium.  Patient does report that she has been having significant GERD over the last week and is increasing amount of stressors in her life which is likely contributing to her chest discomfort.  Patient will be started on Prilosec 20 mg p.o. daily, she will also have Xanax available 0.25 every 8 hours as needed anxiety or insomnia and I have also prescribed coenzyme every 10 as she states she does get myalgias she thinks from her statin medication that she is intermittently compliant with.  At this time patient is appropriate for discharge home and she will follow-up with her primary care practitioner.    Therefore, she is discharged in good and stable condition to home with close outpatient follow-up.      Discharge Date  5/31/2023    FOLLOW UP ITEMS POST DISCHARGE  PCP-2 weeks    DISCHARGE DIAGNOSES  Principal  Problem:    Chest pain (POA: Yes)  Active Problems:    Hypercholesteremia (POA: Yes)    Hypothyroidism (acquired) (POA: Yes)  Resolved Problems:    * No resolved hospital problems. *      FOLLOW UP  No future appointments.  Valeria Bautista M.D.  [address] [address]  [phone]    Schedule an appointment as soon as possible for a visit in 2 week(s)        MEDICATIONS ON DISCHARGE     Medication List        START taking these medications        Instructions   ALPRAZolam 0.25 MG Tabs  Commonly known as: XANAX   Take 1 Tablet by mouth 3 times a day as needed for Sleep or Anxiety for up to 7 days.  Dose: 0.25 mg     Coenzyme Q10 100 MG Caps   Take 1 Capsule by mouth every day.  Dose: 100 mg     omeprazole 20 MG delayed-release capsule  Commonly known as: PRILOSEC   Take 1 Capsule by mouth every day.  Dose: 20 mg            CONTINUE taking these medications        Instructions   B-12 PO   Take 1 Tablet by mouth every day.  Dose: 1 Tablet     EXCEDRIN PO   Take 1 Tablet by mouth 2 times a day as needed (For headache).  Dose: 1 Tablet     L-LYSINE HCL PO   Take 1 Tab by mouth every day.  Dose: 1 Tablet     * levothyroxine 50 MCG Tabs  Commonly known as: SYNTHROID   Take 50 mcg by mouth every 48 hours. Pt took on 5/30/2023, pt also has an RX for 75MCG that she alternates with her 50MCG  Dose: 50 mcg     * levothyroxine 75 MCG Tabs  Commonly known as: SYNTHROID   Take 75 mcg by mouth every 48 hours. Pt took on 5/29/2023,  pt also has an RX for 50MCG that she alternates with her 75MCG  Dose: 75 mcg     lisinopril 10 MG Tabs  Commonly known as: PRINIVIL   10 mg every day.  Dose: 10 mg     pravastatin 20 MG Tabs  Commonly known as: PRAVACHOL   Take 20 mg by mouth every evening.  Dose: 20 mg     VITAMIN C PO   Take 1 Tablet by mouth every day.  Dose: 1 Tablet     VITAMIN D3 PO   Take 1 Capful by mouth every day.  Dose: 1 Capful     ZINC PO   Take 1 Tablet by mouth every day.  Dose: 1 Tablet            * This list has 2 medication(s) that are the same as other medications prescribed for you. Read the directions carefully, and ask your doctor or other care provider to review them with you.                  Allergies  Allergies   Allergen Reactions    Pcn [Penicillins] Hives and Swelling    Sulfa Drugs Nausea     As a young adult         DIET  Orders Placed This Encounter   Procedures    Diet Order Diet: Regular; Miscellaneous modifications: (optional): No Decaf, No Caffeine(for test) (No caffeine for 12 hours prior to exam (decaf, coffee, cola, tea, chocolate))     Standing Status:   Standing     Number of Occurrences:   1     Order Specific Question:   Diet:     Answer:   Regular [1]     Order Specific Question:   Miscellaneous modifications: (optional)     Answer:   No Decaf, No Caffeine(for test) [11]     Comments:   No caffeine for 12 hours prior to exam (decaf, coffee, cola, tea, chocolate)       ACTIVITY  As tolerated.  Weight bearing as tolerated    CONSULTATIONS  none    PROCEDURES  none    LABORATORY  Lab Results   Component Value Date    SODIUM 140 05/30/2023    POTASSIUM 4.5 05/30/2023    CHLORIDE 105 05/30/2023    CO2 24 05/30/2023    GLUCOSE 100 (H) 05/30/2023    BUN 21 05/30/2023    CREATININE 0.83 05/30/2023        Lab Results   Component Value Date    WBC 6.5 05/30/2023    HEMOGLOBIN 14.7 05/30/2023    HEMATOCRIT 44.5 05/30/2023    PLATELETCT 258 05/30/2023        Total time of the discharge process exceeds 34 minutes.

## 2023-05-31 NOTE — PROGRESS NOTES
Received report from previous shift nurse. Assumed patient's cares Pt is up in bed. Pt denies any pain, discomfort, or any needs at this time. Encouraged Pt to call for assistance if needed. Call light within reach.

## 2023-05-31 NOTE — DISCHARGE INSTRUCTIONS
Chest Pain Observation  It is often hard to give a specific diagnosis for the cause of chest pain. Among other possibilities your symptoms might be caused by inadequate oxygen delivery to your heart (angina). Angina that is not treated or evaluated can lead to a heart attack (myocardial infarction) or death.  Blood tests, electrocardiograms, and X-rays may have been done to help determine a possible cause of your chest pain. After evaluation and observation, your health care provider has determined that it is unlikely your pain was caused by an unstable condition that requires hospitalization. However, a full evaluation of your pain may need to be completed, with additional diagnostic testing as directed. It is very important to keep your follow-up appointments. Not keeping your follow-up appointments could result in permanent heart damage, disability, or death. If there is any problem keeping your follow-up appointments, you must call your health care provider.  HOME CARE INSTRUCTIONS   Due to the slight chance that your pain could be angina, it is important to follow your health care provider's treatment plan and also maintain a healthy lifestyle:  Maintain or work toward achieving a healthy weight.  Stay physically active and exercise regularly.  Decrease your salt intake.  Eat a balanced, healthy diet. Talk to a dietitian to learn about heart-healthy foods.  Increase your fiber intake by including whole grains, vegetables, fruits, and nuts in your diet.  Avoid situations that cause stress, anger, or depression.  Take medicines as advised by your health care provider. Report any side effects to your health care provider. Do not stop medicines or adjust the dosages on your own.  Quit smoking. Do not use nicotine patches or gum until you check with your health care provider.  Keep your blood pressure, blood sugar, and cholesterol levels within normal limits.  Limit alcohol intake to no more than 1 drink per day for  women who are not pregnant and 2 drinks per day for men.  Do not abuse drugs.  SEEK IMMEDIATE MEDICAL CARE IF:  You have severe chest pain or pressure which may include symptoms such as:  You feel pain or pressure in your arms, neck, jaw, or back.  You have severe back or abdominal pain, feel sick to your stomach (nauseous), or throw up (vomit).  You are sweating profusely.  You are having a fast or irregular heartbeat.  You feel short of breath while at rest.  You notice increasing shortness of breath during rest, sleep, or with activity.  You have chest pain that does not get better after rest or after taking your usual medicine.  You wake from sleep with chest pain.  You are unable to sleep because you cannot breathe.  You develop a frequent cough or you are coughing up blood.  You feel dizzy, faint, or experience extreme fatigue.  You develop severe weakness, dizziness, fainting, or chills.  Any of these symptoms may represent a serious problem that is an emergency. Do not wait to see if the symptoms will go away. Call your local emergency services (911 in the [place] Do not drive yourself to the hospital.  MAKE SURE YOU:  Understand these instructions.  Will watch your condition.  Will get help right away if you are not doing well or get worse.     This information is not intended to replace advice given to you by your health care provider. Make sure you discuss any questions you have with your health care provider.     Document Released: 01/20/2012 Document Revised: 12/23/2014 Document Reviewed: 06/19/2014  [employer] Patient Education ©2016 [employer]

## 2023-05-31 NOTE — PROGRESS NOTES
Received patient from Night shift RN . Patient is awake and alert.No sign of distress. Patient denies any n/v or chest pain. Fall precaution in placed, kept bed in lowest position and call light within reach.   Patient educated she will be NPO for stress test    0916- patient is off to floor for cardiac test    1120- patient is back to floor.Alert and oriented x 4    1526-Discharge paper work reviewed with patient and  at bedside.Copy given.Questions encouraged and aswered. I.V removed.

## 2023-05-31 NOTE — PROGRESS NOTES
Patient presents to NM suite for cardiac stress test with MPI. Labs and ECG reviewed. No caffeine and NPO confirmed. Resting images attained and patient prepped for treadmill stress study. Total exercise time: 08:24, Max HR: 125, METs: 10.1  Patient reported these symptoms: chest tightness 9/10 at peak exercise, resolved in recovery, slight nausea in recovery, chest tightness radiated to neck in recovery. Water and/or caffeinated beverage provided. Symptoms resolved.

## 2023-05-31 NOTE — CARE PLAN
The patient is Stable - Low risk of patient condition declining or worsening    Shift Goals  Clinical Goals: monitor patient for any chestpain  Patient Goals: rest comfortably    Progress made toward(s) clinical / shift goals:  Patient denies any chest pain. Had cardiac stress test this morning, EF 84%    Patient is not progressing towards the following goals:NA

## 2023-05-31 NOTE — PROGRESS NOTES
2 EKG ordered yesterday afternoon, only one of them preformed. Dr Dean notified, OK with him no to do another one last night.

## 2023-05-31 NOTE — PROGRESS NOTES
Telemetry shift summary:  Rhythm: SR, SB     HR Range: 59 to 70's      Measurements from strip printed at 02:01:32   MA: 0.20   QRS 0.10   QT 0.42     Ectopies (As per Telemetry Tech report) rare to occasional PVC, rare bigeminy.

## 2023-06-06 ENCOUNTER — HOSPITAL ENCOUNTER (EMERGENCY)
Facility: MEDICAL CENTER | Age: 78
End: 2023-06-06
Attending: EMERGENCY MEDICINE
Payer: MEDICARE

## 2023-06-06 ENCOUNTER — APPOINTMENT (OUTPATIENT)
Dept: RADIOLOGY | Facility: MEDICAL CENTER | Age: 78
End: 2023-06-06
Attending: EMERGENCY MEDICINE
Payer: MEDICARE

## 2023-06-06 VITALS
WEIGHT: 198.63 LBS | OXYGEN SATURATION: 95 % | DIASTOLIC BLOOD PRESSURE: 65 MMHG | RESPIRATION RATE: 16 BRPM | SYSTOLIC BLOOD PRESSURE: 158 MMHG | BODY MASS INDEX: 33.09 KG/M2 | HEIGHT: 65 IN | HEART RATE: 75 BPM | TEMPERATURE: 98.2 F

## 2023-06-06 DIAGNOSIS — K52.9 COLITIS: ICD-10-CM

## 2023-06-06 DIAGNOSIS — K62.5 RECTAL BLEEDING: ICD-10-CM

## 2023-06-06 LAB
ALBUMIN SERPL BCP-MCNC: 4.2 G/DL (ref 3.2–4.9)
ALBUMIN/GLOB SERPL: 1.7 G/DL
ALP SERPL-CCNC: 68 U/L (ref 30–99)
ALT SERPL-CCNC: 22 U/L (ref 2–50)
ANION GAP SERPL CALC-SCNC: 12 MMOL/L (ref 7–16)
AST SERPL-CCNC: 23 U/L (ref 12–45)
BASOPHILS # BLD AUTO: 0.1 % (ref 0–1.8)
BASOPHILS # BLD: 0.02 K/UL (ref 0–0.12)
BILIRUB SERPL-MCNC: 0.9 MG/DL (ref 0.1–1.5)
BUN SERPL-MCNC: 24 MG/DL (ref 8–22)
CALCIUM ALBUM COR SERPL-MCNC: 9 MG/DL (ref 8.5–10.5)
CALCIUM SERPL-MCNC: 9.2 MG/DL (ref 8.4–10.2)
CHLORIDE SERPL-SCNC: 102 MMOL/L (ref 96–112)
CO2 SERPL-SCNC: 21 MMOL/L (ref 20–33)
CREAT SERPL-MCNC: 1.02 MG/DL (ref 0.5–1.4)
EOSINOPHIL # BLD AUTO: 0.01 K/UL (ref 0–0.51)
EOSINOPHIL NFR BLD: 0.1 % (ref 0–6.9)
ERYTHROCYTE [DISTWIDTH] IN BLOOD BY AUTOMATED COUNT: 45.4 FL (ref 35.9–50)
GFR SERPLBLD CREATININE-BSD FMLA CKD-EPI: 56 ML/MIN/1.73 M 2
GLOBULIN SER CALC-MCNC: 2.5 G/DL (ref 1.9–3.5)
GLUCOSE SERPL-MCNC: 154 MG/DL (ref 65–99)
HCT VFR BLD AUTO: 42.1 % (ref 37–47)
HGB BLD-MCNC: 14.1 G/DL (ref 12–16)
IMM GRANULOCYTES # BLD AUTO: 0.06 K/UL (ref 0–0.11)
IMM GRANULOCYTES NFR BLD AUTO: 0.4 % (ref 0–0.9)
LIPASE SERPL-CCNC: 24 U/L (ref 7–58)
LYMPHOCYTES # BLD AUTO: 1.66 K/UL (ref 1–4.8)
LYMPHOCYTES NFR BLD: 11.9 % (ref 22–41)
MCH RBC QN AUTO: 32.9 PG (ref 27–33)
MCHC RBC AUTO-ENTMCNC: 33.5 G/DL (ref 32.2–35.5)
MCV RBC AUTO: 98.4 FL (ref 81.4–97.8)
MONOCYTES # BLD AUTO: 0.93 K/UL (ref 0–0.85)
MONOCYTES NFR BLD AUTO: 6.7 % (ref 0–13.4)
NEUTROPHILS # BLD AUTO: 11.29 K/UL (ref 1.82–7.42)
NEUTROPHILS NFR BLD: 80.8 % (ref 44–72)
NRBC # BLD AUTO: 0 K/UL
NRBC BLD-RTO: 0 /100 WBC (ref 0–0.2)
PLATELET # BLD AUTO: 237 K/UL (ref 164–446)
PMV BLD AUTO: 10.2 FL (ref 9–12.9)
POTASSIUM SERPL-SCNC: 4.2 MMOL/L (ref 3.6–5.5)
PROT SERPL-MCNC: 6.7 G/DL (ref 6–8.2)
RBC # BLD AUTO: 4.28 M/UL (ref 4.2–5.4)
SODIUM SERPL-SCNC: 135 MMOL/L (ref 135–145)
WBC # BLD AUTO: 14 K/UL (ref 4.8–10.8)

## 2023-06-06 PROCEDURE — 74177 CT ABD & PELVIS W/CONTRAST: CPT

## 2023-06-06 PROCEDURE — 80053 COMPREHEN METABOLIC PANEL: CPT

## 2023-06-06 PROCEDURE — 96375 TX/PRO/DX INJ NEW DRUG ADDON: CPT

## 2023-06-06 PROCEDURE — 85025 COMPLETE CBC W/AUTO DIFF WBC: CPT

## 2023-06-06 PROCEDURE — 700117 HCHG RX CONTRAST REV CODE 255: Performed by: EMERGENCY MEDICINE

## 2023-06-06 PROCEDURE — 700111 HCHG RX REV CODE 636 W/ 250 OVERRIDE (IP): Performed by: EMERGENCY MEDICINE

## 2023-06-06 PROCEDURE — 83690 ASSAY OF LIPASE: CPT

## 2023-06-06 PROCEDURE — 99284 EMERGENCY DEPT VISIT MOD MDM: CPT

## 2023-06-06 PROCEDURE — 96374 THER/PROPH/DIAG INJ IV PUSH: CPT | Mod: XU

## 2023-06-06 PROCEDURE — 36415 COLL VENOUS BLD VENIPUNCTURE: CPT

## 2023-06-06 RX ORDER — MORPHINE SULFATE 4 MG/ML
4 INJECTION INTRAVENOUS ONCE
Status: COMPLETED | OUTPATIENT
Start: 2023-06-06 | End: 2023-06-06

## 2023-06-06 RX ORDER — ONDANSETRON 2 MG/ML
4 INJECTION INTRAMUSCULAR; INTRAVENOUS ONCE
Status: COMPLETED | OUTPATIENT
Start: 2023-06-06 | End: 2023-06-06

## 2023-06-06 RX ADMIN — IOHEXOL 100 ML: 350 INJECTION, SOLUTION INTRAVENOUS at 06:22

## 2023-06-06 RX ADMIN — MORPHINE SULFATE 4 MG: 4 INJECTION INTRAVENOUS at 05:17

## 2023-06-06 RX ADMIN — ONDANSETRON 4 MG: 2 INJECTION INTRAMUSCULAR; INTRAVENOUS at 05:17

## 2023-06-06 ASSESSMENT — PAIN DESCRIPTION - PAIN TYPE
TYPE: ACUTE PAIN
TYPE: ACUTE PAIN

## 2023-06-06 ASSESSMENT — FIBROSIS 4 INDEX: FIB4 SCORE: 1.49

## 2023-06-06 NOTE — ED NOTES
Pt discharged home with instructions to follow up with primary care and GI.  The pt denies questions at this time.  Pt instructed to come back to the ER if symptoms worsen or they feel they are having a medical emergency.  Pt is alert and oriented, speaking in full sentences. Pt ambulates to the waiting area without incident.

## 2023-06-06 NOTE — ED TRIAGE NOTES
"Chief Complaint   Patient presents with    Rectal Bleeding     77 yo female reports rectal bleeding that started at 7pm last night.   BP (!) 161/76   Pulse 88   Temp 36.3 °C (97.4 °F) (Temporal)   Resp 16   Ht 1.651 m (5' 5\")   Wt 90.1 kg (198 lb 10.2 oz)   SpO2 97%   BMI 33.05 kg/m²   "

## 2023-06-06 NOTE — ED NOTES
Pt rounded on. Pt reports pain is 1/10 at this time and pain medication was effective. Denies nausea at this time. Educated to use call light if they need anything

## 2023-06-06 NOTE — ED PROVIDER NOTES
ED Provider Note    CHIEF COMPLAINT  Chief Complaint   Patient presents with    Rectal Bleeding     79 yo female reports rectal bleeding that started at 7pm last night.        HPI    Primary care provider: Valeria Bautista M.D.   History obtained from: Patient  History limited by: None     Sophia Leahy is a 78 y.o. female who presents to the ED with  complaining of rectal bleeding.  Patient reports that she noticed a small clot around 7:00 last night and thought that it may have been due to hemorrhoids.  She reports diarrhea and feeling of abdominal pressure across her mid to lower abdomen.  She tried to go to sleep and woke up around 4:00 this morning and had some nausea after drinking coffee and then had a gush of blood from rectum.  She denies taking blood thinners.  She continues to have nausea without vomiting.  She denies dysuria or hematuria.  No recent travel/suspicious oral intake/ill contacts.  She reports having had several colonoscopies that were unremarkable.    REVIEW OF SYSTEMS  Please see HPI for pertinent positives/negatives.  All other systems reviewed and are negative.     PAST MEDICAL HISTORY  Past Medical History:   Diagnosis Date    Bowel habit changes 2022    constipation    Urinary bladder disorder 2022    prolapsed bladder    Anesthesia 2022    sister  from halothane    High cholesterol     Hypertension     Thyroid disorder         SURGICAL HISTORY  Past Surgical History:   Procedure Laterality Date    OK COMBINED ANT/POST COLPORRHAPHY  3/8/2022    Procedure: COLPORRHAPHY, COMBINED ANTEROPOSTERIOR;  Surgeon: Joyce Alvarez D.O.;  Location: SURGERY [facility];  Service: Obstetrics    OK CYSTOURETHROSCOPY N/A 3/8/2022    Procedure: CYSTOSCOPY;  Surgeon: Joyce Alvarez D.O.;  Location: [facility];  Service: Obstetrics    ABDOMINAL HYSTERECTOMY TOTAL      CATARACT  "EXTRACTION WITH IOL      LUMPECTOMY      OTHER ORTHOPEDIC SURGERY Right     knee        SOCIAL HISTORY  Social History     Tobacco Use    Smoking status: Never    Smokeless tobacco: Never   Vaping Use    Vaping Use: Never used   Substance and Sexual Activity    Alcohol use: Yes     Comment: occasional:2-4 drinks/month    Drug use: No    Sexual activity: Not on file        FAMILY HISTORY  No family history on file.     CURRENT MEDICATIONS  Home Medications       Reviewed by Mel Lu R.N. (Registered Nurse) on 06/06/23 at 0509  Med List Status: Not Addressed     Medication Last Dose Status   ALPRAZolam (XANAX) 0.25 MG Tab  Active   Ascorbic Acid (VITAMIN C PO)  Active   Aspirin-Acetaminophen-Caffeine (EXCEDRIN PO)  Active   Cholecalciferol (VITAMIN D3 PO)  Active   Coenzyme Q10 100 MG Cap  Active   Cyanocobalamin (B-12 PO)  Active   L-LYSINE HCL PO  Active   levothyroxine (SYNTHROID) 50 MCG TABS  Active   levothyroxine (SYNTHROID) 75 MCG Tab  Active   lisinopril (PRINIVIL) 10 MG Tab  Active   Multiple Vitamins-Minerals (ZINC PO)  Active   omeprazole (PRILOSEC) 20 MG delayed-release capsule  Active   pravastatin (PRAVACHOL) 20 MG TABS  Active                     ALLERGIES  Allergies   Allergen Reactions    Pcn [Penicillins] Hives and Swelling    Sulfa Drugs Nausea     As a young adult          PHYSICAL EXAM  VITAL SIGNS: BP (!) 158/65   Pulse 75   Temp 36.8 °C (98.2 °F) (Temporal)   Resp 16   Ht 1.651 m (5' 5\")   Wt 90.1 kg (198 lb 10.2 oz)   SpO2 95%   BMI 33.05 kg/m²  @MARLIIN[[id number]::@     Pulse ox interpretation: 97% I interpret this pulse ox as normal     Constitutional: Well developed, well nourished, alert in no apparent distress, nontoxic appearance    HENT: No external signs of trauma, normocephalic  Eyes: PERRL, conjunctiva without erythema, no discharge, no icterus    Neck: Soft and supple, trachea midline, no stridor, no tenderness, no LAD, no JVD, good ROM    Cardiovascular: Regular rate and " rhythm, no murmurs/rubs/gallops, strong distal pulses and good perfusion    Thorax & Lungs: No respiratory distress, CTAB    Abdomen: Soft, mild tenderness across mid to lower abdomen, nondistended, no guarding, no rebound, normal BS    Back: No CVAT     Extremities: No cyanosis, no edema, no gross deformity, good ROM, intact distal pulses with brisk cap refill    Skin: Warm, dry, no pallor/cyanosis, no rash noted       Neuro: A/O times 3, no focal deficits noted, ambulating without difficulty  Psychiatric: Cooperative, normal mood and affect, normal judgement, appropriate for clinical situation        DIAGNOSTIC STUDIES / PROCEDURES        LABS  All labs reviewed by me.     Results for orders placed or performed during the hospital encounter of 06/06/23   CBC WITH DIFFERENTIAL   Result Value Ref Range    WBC 14.0 (H) 4.8 - 10.8 K/uL    RBC 4.28 4.20 - 5.40 M/uL    Hemoglobin 14.1 12.0 - 16.0 g/dL    Hematocrit 42.1 37.0 - 47.0 %    MCV 98.4 (H) 81.4 - 97.8 fL    MCH 32.9 27.0 - 33.0 pg    MCHC 33.5 32.2 - 35.5 g/dL    RDW 45.4 35.9 - 50.0 fL    Platelet Count 237 164 - 446 K/uL    MPV 10.2 9.0 - 12.9 fL    Neutrophils-Polys 80.80 (H) 44.00 - 72.00 %    Lymphocytes 11.90 (L) 22.00 - 41.00 %    Monocytes 6.70 0.00 - 13.40 %    Eosinophils 0.10 0.00 - 6.90 %    Basophils 0.10 0.00 - 1.80 %    Immature Granulocytes 0.40 0.00 - 0.90 %    Nucleated RBC 0.00 0.00 - 0.20 /100 WBC    Neutrophils (Absolute) 11.29 (H) 1.82 - 7.42 K/uL    Lymphs (Absolute) 1.66 1.00 - 4.80 K/uL    Monos (Absolute) 0.93 (H) 0.00 - 0.85 K/uL    Eos (Absolute) 0.01 0.00 - 0.51 K/uL    Baso (Absolute) 0.02 0.00 - 0.12 K/uL    Immature Granulocytes (abs) 0.06 0.00 - 0.11 K/uL    NRBC (Absolute) 0.00 K/uL   COMP METABOLIC PANEL   Result Value Ref Range    Sodium 135 135 - 145 mmol/L    Potassium 4.2 3.6 - 5.5 mmol/L    Chloride 102 96 - 112 mmol/L    Co2 21 20 - 33 mmol/L    Anion Gap 12.0 7.0 - 16.0    Glucose 154 (H) 65 - 99 mg/dL    Bun 24 (H) 8  - 22 mg/dL    Creatinine 1.02 0.50 - 1.40 mg/dL    Calcium 9.2 8.4 - 10.2 mg/dL    AST(SGOT) 23 12 - 45 U/L    ALT(SGPT) 22 2 - 50 U/L    Alkaline Phosphatase 68 30 - 99 U/L    Total Bilirubin 0.9 0.1 - 1.5 mg/dL    Albumin 4.2 3.2 - 4.9 g/dL    Total Protein 6.7 6.0 - 8.2 g/dL    Globulin 2.5 1.9 - 3.5 g/dL    A-G Ratio 1.7 g/dL   LIPASE   Result Value Ref Range    Lipase 24 7 - 58 U/L   CORRECTED CALCIUM   Result Value Ref Range    Correct Calcium 9.0 8.5 - 10.5 mg/dL   ESTIMATED GFR   Result Value Ref Range    GFR (CKD-EPI) 56 (A) >60 mL/min/1.73 m 2        RADIOLOGY  I have independently interpreted the diagnostic imaging associated with this visit and am waiting the final reading from the radiologist.     CT-ABDOMEN-PELVIS WITH   Final Result         1.  Segmental colitis involving the distal transverse colon to the proximal sigmoid, consider inflammatory or infectious versus ischemic etiologies in the appropriate clinical setting.   2.  Diverticulosis             COURSE & MEDICAL DECISION MAKING  Nursing notes, VS, PMSFHx reviewed in chart.     Review of past medical records shows the patient was last admitted to this hospital May 30, 2023 for chest pain and discharged on May 31, 2023.  Patient had colporrhaphy, combined anteroposterior cystoscopy performed on March 8, 2022.      Differential diagnoses considered include but are not limited to: Upper/lower GI bleed, anemia, hemorrhoids, gastroenteritis, diverticulitis, mesenteric ischemia, polyps, cancer       ED Observation Status? Yes; I am placing the patient in to an observation status due to a diagnostic uncertainty as well as therapeutic intensity. Patient placed in observation status at 5:05 AM, 6/6/2023.     Observation plan is as follows: We will obtain laboratory and imaging studies and monitor patient in the ED.    Upon Reevaluation, the patient's condition has: Improved; and will be discharged.    Patient discharged from ED Observation status at  0700 on June 6, 2023.      INITIAL ASSESSMENT AND PLAN  Care Narrative: This is a 78-year-old female patient with previous medical history including hypertension, high cholesterol, thyroid disorder and reports previous unremarkable colonoscopy not on any blood thinners currently who presents to the ED complaining of rectal bleeding and lower abdominal discomfort along with nausea.  We will obtain laboratory and imaging studies and closely monitor patient in the ED.  Patient be given nausea and pain medicine.      Discussion of management with other QHP or appropriate source(s): None     Escalation of care considered, and ultimately not performed: acute inpatient care management, however at this time, the patient is most appropriate for outpatient management.     Decision tools and prescription drugs considered including, but not limited to: Pain Medications   .        History and physical exam as above.  Patient without significant anemia on laboratory testing.  Leukocytosis likely stress reaction or inflammatory/infectious etiology.  CT abdomen/pelvis with findings as above.  Patient received Zofran and morphine in the ED with improvement of her symptoms.  I discussed the findings with the patient and .  This is an alert and very pleasant well-appearing patient in no acute distress and nontoxic in appearance on recheck without evidence for acute abdomen.  She reports that her pain is down to 1 out of 10.  At this time, I have low clinical suspicion for ischemic bowel, abscess, perforation or acute surgical abdomen.  Patient without risk factors for severe hemorrhage and is not actively bleeding in the ED.  I discussed with them worrisome signs and symptoms and return to ED precautions as well as outpatient follow-up.  Patient also noted with incidental elevated blood pressure for which she can follow-up on outpatient basis for further management.  Patient and  verbalized understanding and agreed with  plan of care with no further questions or concerns.      The patient is referred to a primary physician for blood pressure management, diabetic screening, and for all other preventative health concerns.       FINAL IMPRESSION  1. Rectal bleeding Acute   2. Colitis Active          DISPOSITION  Patient will be discharged home in stable condition.       FOLLOW UP  Valeria Bautista M.D.  [address] [address]  [place] [id number] [phone]    Call today      Please follow-up with your GI doctor    Call today      [facility], Emergency Dept  [address]  [place] [id number] [phone]    If symptoms worsen         OUTPATIENT MEDICATIONS  Discharge Medication List as of 6/6/2023  7:02 AM             Electronically signed by: Jn Francis D.O., 6/6/2023 5:03 AM      Portions of this record were made with voice recognition software.  Despite my review, spelling/grammar/context errors may still remain.  Interpretation of this chart should be taken in this context.

## 2023-06-06 NOTE — ED NOTES
"Pt ambulated to room 9 in the ER with CC of \"clots coming out of my rectum\". Pt is alert and oriented, complains of nausea and abdominal pain. Denies SOB  "

## 2023-06-15 ENCOUNTER — HOSPITAL ENCOUNTER (OUTPATIENT)
Dept: LAB | Facility: MEDICAL CENTER | Age: 78
End: 2023-06-15
Attending: FAMILY MEDICINE
Payer: MEDICARE

## 2023-06-15 LAB
ALBUMIN SERPL BCP-MCNC: 4.3 G/DL (ref 3.2–4.9)
ALBUMIN/GLOB SERPL: 1.7 G/DL
ALP SERPL-CCNC: 57 U/L (ref 30–99)
ALT SERPL-CCNC: 25 U/L (ref 2–50)
ANION GAP SERPL CALC-SCNC: 15 MMOL/L (ref 7–16)
AST SERPL-CCNC: 26 U/L (ref 12–45)
BASOPHILS # BLD AUTO: 0.4 % (ref 0–1.8)
BASOPHILS # BLD: 0.03 K/UL (ref 0–0.12)
BILIRUB SERPL-MCNC: 0.6 MG/DL (ref 0.1–1.5)
BUN SERPL-MCNC: 24 MG/DL (ref 8–22)
CALCIUM ALBUM COR SERPL-MCNC: 9.3 MG/DL (ref 8.5–10.5)
CALCIUM SERPL-MCNC: 9.5 MG/DL (ref 8.5–10.5)
CHLORIDE SERPL-SCNC: 101 MMOL/L (ref 96–112)
CO2 SERPL-SCNC: 21 MMOL/L (ref 20–33)
CREAT SERPL-MCNC: 0.87 MG/DL (ref 0.5–1.4)
EOSINOPHIL # BLD AUTO: 0.05 K/UL (ref 0–0.51)
EOSINOPHIL NFR BLD: 0.6 % (ref 0–6.9)
ERYTHROCYTE [DISTWIDTH] IN BLOOD BY AUTOMATED COUNT: 46.3 FL (ref 35.9–50)
GFR SERPLBLD CREATININE-BSD FMLA CKD-EPI: 68 ML/MIN/1.73 M 2
GLOBULIN SER CALC-MCNC: 2.5 G/DL (ref 1.9–3.5)
GLUCOSE SERPL-MCNC: 92 MG/DL (ref 65–99)
HCT VFR BLD AUTO: 39.9 % (ref 37–47)
HGB BLD-MCNC: 13 G/DL (ref 12–16)
IMM GRANULOCYTES # BLD AUTO: 0.02 K/UL (ref 0–0.11)
IMM GRANULOCYTES NFR BLD AUTO: 0.3 % (ref 0–0.9)
LYMPHOCYTES # BLD AUTO: 1.92 K/UL (ref 1–4.8)
LYMPHOCYTES NFR BLD: 24.7 % (ref 22–41)
MCH RBC QN AUTO: 32.7 PG (ref 27–33)
MCHC RBC AUTO-ENTMCNC: 32.6 G/DL (ref 32.2–35.5)
MCV RBC AUTO: 100.3 FL (ref 81.4–97.8)
MONOCYTES # BLD AUTO: 0.6 K/UL (ref 0–0.85)
MONOCYTES NFR BLD AUTO: 7.7 % (ref 0–13.4)
NEUTROPHILS # BLD AUTO: 5.14 K/UL (ref 1.82–7.42)
NEUTROPHILS NFR BLD: 66.3 % (ref 44–72)
NRBC # BLD AUTO: 0 K/UL
NRBC BLD-RTO: 0 /100 WBC (ref 0–0.2)
PLATELET # BLD AUTO: 337 K/UL (ref 164–446)
PMV BLD AUTO: 10.3 FL (ref 9–12.9)
POTASSIUM SERPL-SCNC: 4.1 MMOL/L (ref 3.6–5.5)
PROT SERPL-MCNC: 6.8 G/DL (ref 6–8.2)
RBC # BLD AUTO: 3.98 M/UL (ref 4.2–5.4)
SODIUM SERPL-SCNC: 137 MMOL/L (ref 135–145)
WBC # BLD AUTO: 7.8 K/UL (ref 4.8–10.8)

## 2023-06-15 PROCEDURE — 36415 COLL VENOUS BLD VENIPUNCTURE: CPT

## 2023-06-15 PROCEDURE — 80053 COMPREHEN METABOLIC PANEL: CPT

## 2023-06-15 PROCEDURE — 85025 COMPLETE CBC W/AUTO DIFF WBC: CPT

## 2023-06-27 ENCOUNTER — TELEPHONE (OUTPATIENT)
Dept: HEALTH INFORMATION MANAGEMENT | Facility: OTHER | Age: 78
End: 2023-06-27
Payer: MEDICARE

## 2023-06-27 ENCOUNTER — TELEPHONE (OUTPATIENT)
Dept: CARDIOLOGY | Facility: MEDICAL CENTER | Age: 78
End: 2023-06-27
Payer: MEDICARE

## 2023-06-27 NOTE — TELEPHONE ENCOUNTER
Last OV: 2021  Proposed Surgery: Colonoscopy with Deep Sedation  Surgery Date: 23  Requesting Office Name: [facility]  Fax Number: [phone]  Preference of Location (default is surgery center unless specified by Cardiologist or [facility])  Prior Clearance Addressed: No      Anticoags/Antiplatelets: Other NONE  Outstanding Cardiac Imaging : No  Stent, Cardiac Devices, or Catheterization: No  Ablation, TAVR, Cardioversion: No  Recent Cardiac Hospitalization: Yes  Date:  22            When: Greater than 6 months since hospitalization.   History (cardiac history):   Past Medical History:   Diagnosis Date    Anesthesia 2022    sister  from halothane    Bowel habit changes 2022    constipation    High cholesterol     Hypertension     Thyroid disorder     Urinary bladder disorder 2022    prolapsed bladder             Surgical Clearance Letter Sent: NO. Provider to advise.   **Scan clearance request letter into [facility].**    Patient scheduled for appointment 7/3/23

## 2023-07-03 ENCOUNTER — APPOINTMENT (OUTPATIENT)
Dept: CARDIOLOGY | Facility: MEDICAL CENTER | Age: 78
End: 2023-07-03
Attending: NURSE PRACTITIONER
Payer: MEDICARE

## 2023-07-03 NOTE — PROGRESS NOTES
No chief complaint on file.      Subjective     Sophia Leahy is a 78 y.o. female who presents today for follow-up after recent emergency department visit for chest pain.  She complained of 2 weeks of exertional chest pain, the emergency department underwent nuclear stress test abnormal troponins, elevated LDL.    Patient has a medical history significant for hypertension, hyperlipidemia and hypothyroidism.  Previously seen by Dr. Rosen in the cardiology office in .    Today in the office***  Cholesterol?  Chest pain?    Past Medical History:   Diagnosis Date    Anesthesia 2022    sister  from halothane    Bowel habit changes 2022    constipation    High cholesterol     Hypertension     Thyroid disorder     Urinary bladder disorder 2022    prolapsed bladder     Past Surgical History:   Procedure Laterality Date    MO COMBINED ANT/POST COLPORRHAPHY  3/8/2022    Procedure: COLPORRHAPHY, COMBINED ANTEROPOSTERIOR;  Surgeon: Joyce Alvarez D.O.;  Location: SURGERY [facility];  Service: Obstetrics    MO CYSTOURETHROSCOPY N/A 3/8/2022    Procedure: CYSTOSCOPY;  Surgeon: Joyce Alvarez D.O.;  Location: SURGERY [facility];  Service: Obstetrics    ABDOMINAL HYSTERECTOMY TOTAL      CATARACT EXTRACTION WITH IOL      LUMPECTOMY      OTHER ORTHOPEDIC SURGERY Right     knee     No family history on file.  Social History     Socioeconomic History    Marital status:      Spouse name: Not on file    Number of children: Not on file    Years of education: Not on file    Highest education level: Not on file   Occupational History    Not on file   Tobacco Use    Smoking status: Never    Smokeless tobacco: Never   Vaping Use    Vaping Use: Never used   Substance and Sexual Activity    Alcohol use: Yes     Comment: occasional:2-4 drinks/month    Drug use: No    Sexual activity: Not on file   Other Topics Concern    Not on file   Social History Narrative    Not on file     Social  Determinants of Health     Financial Resource Strain: Not on file   Food Insecurity: Not on file   Transportation Needs: Not on file   Physical Activity: Not on file   Stress: Not on file   Social Connections: Not on file   Intimate Partner Violence: Not on file   Housing Stability: Not on file     Allergies   Allergen Reactions    Pcn [Penicillins] Hives and Swelling    Sulfa Drugs Nausea     As a young adult       Outpatient Encounter Medications as of 7/3/2023   Medication Sig Dispense Refill    Coenzyme Q10 100 MG Cap Take 1 Capsule by mouth every day. 30 Capsule     omeprazole (PRILOSEC) 20 MG delayed-release capsule Take 1 Capsule by mouth every day. 30 Capsule 2    Aspirin-Acetaminophen-Caffeine (EXCEDRIN PO) Take 1 Tablet by mouth 2 times a day as needed (For headache).      Cyanocobalamin (B-12 PO) Take 1 Tablet by mouth every day.      lisinopril (PRINIVIL) 10 MG Tab 10 mg every day.      Ascorbic Acid (VITAMIN C PO) Take 1 Tablet by mouth every day.      Cholecalciferol (VITAMIN D3 PO) Take 1 Capful by mouth every day.      Multiple Vitamins-Minerals (ZINC PO) Take 1 Tablet by mouth every day.      levothyroxine (SYNTHROID) 75 MCG Tab Take 75 mcg by mouth every 48 hours. Pt took on 5/29/2023,  pt also has an RX for 50MCG that she alternates with her 75MCG      levothyroxine (SYNTHROID) 50 MCG TABS Take 50 mcg by mouth every 48 hours. Pt took on 5/30/2023, pt also has an RX for 75MCG that she alternates with her 50MCG      pravastatin (PRAVACHOL) 20 MG TABS Take 20 mg by mouth every evening.      L-LYSINE HCL PO Take 1 Tab by mouth every day.       No facility-administered encounter medications on file as of 7/3/2023.     ROS           Objective     There were no vitals taken for this visit.    Physical Exam       Lab Results   Component Value Date/Time    CHOLSTRLTOT 249 (H) 05/31/2023 03:30 AM     (H) 05/31/2023 03:30 AM    HDL 69 05/31/2023 03:30 AM    TRIGLYCERIDE 112 05/31/2023 03:30 AM        Lab Results   Component Value Date/Time    SODIUM 137 06/15/2023 01:16 PM    POTASSIUM 4.1 06/15/2023 01:16 PM    CHLORIDE 101 06/15/2023 01:16 PM    CO2 21 06/15/2023 01:16 PM    GLUCOSE 92 06/15/2023 01:16 PM    BUN 24 (H) 06/15/2023 01:16 PM    CREATININE 0.87 06/15/2023 01:16 PM     Lab Results   Component Value Date/Time    ALKPHOSPHAT 57 06/15/2023 01:16 PM    ASTSGOT 26 06/15/2023 01:16 PM    ALTSGPT 25 06/15/2023 01:16 PM    TBILIRUBIN 0.6 06/15/2023 01:16 PM      Nuclear medicine cardiac stress test 5/31/2023  NUCLEAR IMAGING INTERPRETATION   No evidence of significant jeopardized viable myocardium or prior myocardial    infarction.   Normal left ventricular size, ejection fraction, and wall motion.   ECG INTERPRETATION   Negative stress ECG for ischemia.    Transthoracic echocardiogram 2/10/2022  CONCLUSIONS  Normal left ventricular systolic function.  The left ventricular ejection fraction is visually estimated to be 75%.  Normal right ventricular size and systolic function.  No valvular abnormalities.     CT cardiac scoring 1/20/2020  Coronary calcification:  LMA - 34.3  LCX - 0.0  LAD - 0.0  RCA - 0.0  PDA - 0.0  Total Calcium Score: 34.3     Percentile: Calcium score is above the 25th percentile for the patient's age and sex.     Other findings:  Heart: Normal size. Aortic valve calcifications.  Lungs: Clear.  Mediastinum: Normal.  Upper abdomen: Normal.    Assessment & Plan     No diagnosis found.    Medical Decision Making: Today's Assessment/Status/Plan:

## 2023-07-07 NOTE — PROGRESS NOTES
Chief Complaint   Patient presents with    Hypercholesterolemia Follow-up     Follow up          Subjective:   Sophia Leahy is a 78 y.o. female who presents today for follow-up with her  Hi.    Previous patient of Dr. Rosen.  Last seen  for hypercholesterolemia with a calcium score of 34.    This past month she was admitted to the hospital for chest pain:   She describes high level of stress in her life currently.  On the day she went to the ER she developed chest pressure, jaw pain, arm pain during a stressful time.  In the emergency department her EKG was sinus rhythm with T wave abnormalities.  She was monitored on the telemetry unit without any evidence of arrhythmia.  She had 3 troponin test which remained less than 20.   She underwent exercise nuclear stress test which showed no evidence of inducible ischemia or jeopardized myocardium.  She did not have a TTE done that admission.     Since she was discharged she has not had recurrence of the typical chest pain.  In the last few months she has not been exercising but prior to this she was exercising regularly and not experiencing any angina.    Additionally she is scheduled for a colonoscopy and referred back here as the gastroenterologist heard a murmur and needed her to see cardiology first.    At baseline she describes dyspnea with mild to moderate exertion such as walking across the parking lot.  She has noticed this for about the last year.  At times she has to stop walking to catch her breath.  She does not have any leg swelling, orthopnea.  She has had a 10 pound weight loss recently.      Past Medical History:   Diagnosis Date    Anesthesia 2022    sister  from halothane    Bowel habit changes 2022    constipation    High cholesterol     Hypertension     Thyroid disorder     Urinary bladder disorder 2022    prolapsed bladder         Past Surgical History:   Procedure Laterality Date    [place] COMBINED ANT/POST  COLPORRHAPHY  3/8/2022    Procedure: COLPORRHAPHY, COMBINED ANTEROPOSTERIOR;  Surgeon: Joyce Alvarez D.O.;  Location: SURGERY [facility];  Service: Obstetrics    CA CYSTOURETHROSCOPY N/A 3/8/2022    Procedure: CYSTOSCOPY;  Surgeon: Joyce Alvarez D.O.;  Location: SURGERY [facility];  Service: Obstetrics    ABDOMINAL HYSTERECTOMY TOTAL      CATARACT EXTRACTION WITH IOL      LUMPECTOMY      OTHER ORTHOPEDIC SURGERY Right     knee         No family history on file.      Social History     Tobacco Use   Smoking Status Never   Smokeless Tobacco Never   Vaping Use    Vaping Use: Never used          Social History     Substance and Sexual Activity   Alcohol Use Yes    Comment: occasional:2-4 drinks/month         Allergies   Allergen Reactions    Pcn [Penicillins] Hives and Swelling    Sulfa Drugs Nausea     As a young adult           Outpatient Encounter Medications as of 7/10/2023   Medication Sig Dispense Refill    escitalopram (LEXAPRO) 10 MG Tab       pravastatin (PRAVACHOL) 40 MG tablet       nitroglycerin (NITROSTAT) 0.4 MG SL Tab Place 1 Tablet under the tongue as needed for Chest Pain (may repeat after 5min, no more than 2 doses). 25 Tablet 1    ezetimibe (ZETIA) 10 MG Tab Take 1 Tablet by mouth every day. 90 Tablet 3    Coenzyme Q10 100 MG Cap Take 1 Capsule by mouth every day. 30 Capsule     omeprazole (PRILOSEC) 20 MG delayed-release capsule Take 1 Capsule by mouth every day. 30 Capsule 2    Aspirin-Acetaminophen-Caffeine (EXCEDRIN PO) Take 1 Tablet by mouth 2 times a day as needed (For headache).      Cyanocobalamin (B-12 PO) Take 1 Tablet by mouth every day.      lisinopril (PRINIVIL) 10 MG Tab 10 mg every day.      Ascorbic Acid (VITAMIN C PO) Take 1 Tablet by mouth every day.      Cholecalciferol (VITAMIN D3 PO) Take 1 Capful by mouth every day.      levothyroxine (SYNTHROID) 75 MCG Tab Take 75 mcg by mouth every 48 hours. Pt took on 5/29/2023,  pt also has an RX for 50MCG that she  "alternates with her 75MCG      levothyroxine (SYNTHROID) 50 MCG TABS Take 50 mcg by mouth every 48 hours. Pt took on 5/30/2023, pt also has an RX for 75MCG that she alternates with her 50MCG      L-LYSINE HCL PO Take 1 Tab by mouth every day.      [DISCONTINUED] clarithromycin (BIAXIN) 500 MG Tab Take 500 mg by mouth 2 times a day. (Patient not taking: Reported on 7/10/2023)      [DISCONTINUED] doxycycline (VIBRAMYCIN) 100 MG Tab  (Patient not taking: Reported on 7/10/2023)      [DISCONTINUED] pravastatin (PRAVACHOL) 40 MG tablet       [DISCONTINUED] Multiple Vitamins-Minerals (ZINC PO) Take 1 Tablet by mouth every day. (Patient not taking: Reported on 7/10/2023)      [DISCONTINUED] pravastatin (PRAVACHOL) 20 MG TABS Take 20 mg by mouth every evening. (Patient not taking: Reported on 7/10/2023)       No facility-administered encounter medications on file as of 7/10/2023.         Review of Systems   Constitutional:  Negative for chills, fever and malaise/fatigue.        No unexpected weight changes   Respiratory:  Positive for shortness of breath. Negative for cough.    Cardiovascular:  Negative for chest pain, palpitations, orthopnea, leg swelling and PND.   Gastrointestinal:  Negative for nausea.   Neurological:  Negative for dizziness.   Psychiatric/Behavioral:  Negative for substance abuse.           Objective:   /60 (BP Location: Left arm, Patient Position: Sitting)   Pulse 76   Resp 16   Ht 1.651 m (5' 5\")   Wt 86.2 kg (190 lb)   SpO2 97%   BMI 31.62 kg/m²        Physical Exam  Vitals reviewed.   HENT:      Head: Normocephalic and atraumatic.   Cardiovascular:      Rate and Rhythm: Normal rate and regular rhythm.      Heart sounds: Murmur (systolic) heard.   Pulmonary:      Effort: Pulmonary effort is normal. No respiratory distress.   Abdominal:      General: There is no distension.   Musculoskeletal:      Right lower leg: No edema.      Left lower leg: No edema.   Skin:     General: Skin is warm " and dry.   Neurological:      Mental Status: She is alert.      Gait: Gait normal.   Psychiatric:         Judgment: Judgment normal.            Assessment:     1. Hypercholesteremia  ezetimibe (ZETIA) 10 MG Tab      2. Stable angina (HCC)  nitroglycerin (NITROSTAT) 0.4 MG SL Tab    EC-ECHOCARDIOGRAM COMPLETE W/O CONT      3. Dyspnea on exertion  EC-ECHOCARDIOGRAM COMPLETE W/O CONT           Medical Decision Making:  Today's Assessment / Plan:   Stable Angina  - chest pain brought on by stress. No ischemia on exercise stress test with good exercise tolerance  - discussed possibility of small vessel disease causing symptoms,   - cont statin   - use nitro PRN  - ER precautions    Systolic Murmur  - I think it is a flow murmur, she had no valve disease on echo from 2022 though she now describes dyspnea with exertion over the last year and we'll check an echocardiogram.     Hyperlipidemia  -LDL high despite pravastatin 40 mg which is the highest tolerated dose of statin.  Add Zetia 10 mg daily    Preoperative evaluation for colonoscopy  -Discussed recent symptoms and testing with patient.  Patient can proceed with procedure without further testing    Return to clinic in 3mo

## 2023-07-10 ENCOUNTER — OFFICE VISIT (OUTPATIENT)
Dept: CARDIOLOGY | Facility: MEDICAL CENTER | Age: 78
End: 2023-07-10
Attending: PHYSICIAN ASSISTANT
Payer: MEDICARE

## 2023-07-10 VITALS
RESPIRATION RATE: 16 BRPM | DIASTOLIC BLOOD PRESSURE: 60 MMHG | SYSTOLIC BLOOD PRESSURE: 124 MMHG | WEIGHT: 190 LBS | HEART RATE: 76 BPM | BODY MASS INDEX: 31.65 KG/M2 | OXYGEN SATURATION: 97 % | HEIGHT: 65 IN

## 2023-07-10 DIAGNOSIS — I20.89 STABLE ANGINA (HCC): ICD-10-CM

## 2023-07-10 DIAGNOSIS — E78.00 HYPERCHOLESTEREMIA: ICD-10-CM

## 2023-07-10 DIAGNOSIS — R06.09 DYSPNEA ON EXERTION: ICD-10-CM

## 2023-07-10 PROCEDURE — 99214 OFFICE O/P EST MOD 30 MIN: CPT | Performed by: PHYSICIAN ASSISTANT

## 2023-07-10 PROCEDURE — 99212 OFFICE O/P EST SF 10 MIN: CPT | Performed by: PHYSICIAN ASSISTANT

## 2023-07-10 PROCEDURE — 99213 OFFICE O/P EST LOW 20 MIN: CPT | Performed by: PHYSICIAN ASSISTANT

## 2023-07-10 PROCEDURE — 3078F DIAST BP <80 MM HG: CPT | Performed by: PHYSICIAN ASSISTANT

## 2023-07-10 PROCEDURE — 3074F SYST BP LT 130 MM HG: CPT | Performed by: PHYSICIAN ASSISTANT

## 2023-07-10 RX ORDER — PRAVASTATIN SODIUM 40 MG
TABLET ORAL
COMMUNITY
End: 2023-07-10

## 2023-07-10 RX ORDER — CLARITHROMYCIN 500 MG/1
500 TABLET, COATED ORAL 2 TIMES DAILY
COMMUNITY
Start: 2023-04-14 | End: 2023-07-10

## 2023-07-10 RX ORDER — DOXYCYCLINE HYCLATE 100 MG
TABLET ORAL
COMMUNITY
Start: 2023-05-12 | End: 2023-07-10

## 2023-07-10 RX ORDER — PRAVASTATIN SODIUM 40 MG
TABLET ORAL
COMMUNITY
Start: 2023-05-31

## 2023-07-10 RX ORDER — EZETIMIBE 10 MG/1
10 TABLET ORAL DAILY
Qty: 90 TABLET | Refills: 3 | Status: SHIPPED | OUTPATIENT
Start: 2023-07-10

## 2023-07-10 RX ORDER — ESCITALOPRAM OXALATE 10 MG/1
TABLET ORAL
COMMUNITY
Start: 2023-07-03

## 2023-07-10 RX ORDER — NITROGLYCERIN 0.4 MG/1
0.4 TABLET SUBLINGUAL PRN
Qty: 25 TABLET | Refills: 1 | Status: SHIPPED | OUTPATIENT
Start: 2023-07-10

## 2023-07-10 ASSESSMENT — ENCOUNTER SYMPTOMS
PND: 0
PALPITATIONS: 0
DIZZINESS: 0
FEVER: 0
SHORTNESS OF BREATH: 1
CHILLS: 0
ORTHOPNEA: 0
NAUSEA: 0
COUGH: 0

## 2023-07-10 ASSESSMENT — FIBROSIS 4 INDEX: FIB4 SCORE: 1.22

## 2023-07-10 ASSESSMENT — LIFESTYLE VARIABLES: SUBSTANCE_ABUSE: 0

## 2023-07-10 NOTE — Clinical Note
OK to have colonoscopy without further testing in outpatient procedure setting. There is a [facility] [place], although patient said it was [facility], you might want to confirm with her.  Thanks, JA

## 2023-07-11 ENCOUNTER — TELEPHONE (OUTPATIENT)
Dept: CARDIOLOGY | Facility: MEDICAL CENTER | Age: 78
End: 2023-07-11
Payer: MEDICARE

## 2023-07-11 NOTE — TELEPHONE ENCOUNTER
Last OV: 7/10/23  Proposed Surgery: Colonoscopy  Surgery Date: TBD  Requesting Office Name: [facility]  Fax Number: [phone]  Preference of Location (default is surgery center unless specified by Cardiologist or ARLINE)  Prior Clearance Addressed: Yes          Surgical Clearance Letter Sent: YES   **Scan clearance request letter into [facility]

## 2023-07-11 NOTE — LETTER
PROCEDURE/SURGERY CLEARANCE FORM    Date: 7/11/2023   Patient Name: Sophia Leahy    Dear Surgeon or Proceduralist,      Thank you for your request for cardiac stratification of our mutual patient Sophia Leahy 1945. We have reviewed their [facility] records; and to the best of our understanding this patient has not had stenting, ablation, cardiothoracic surgery or hospitalization for cardiovascular reasons in the past 6 months.  Sophia Leahy has been seen within the past 18 months and is considered to have non-modifiable cardiac risk for this low-risk procedure/surgery. They may proceed from a cardiovascular standpoint and may hold their antiplatelet/anticoagulation as briefly as possible. Please have patient resume this medication when hemodynamically stable to do so.     Aspirin or Prasugrel   - hold 7 days prior to procedure/surgery, resume when hemodynamically stable      Clopidrogrel or Ticagrelor  - hold 7 days for all neurological procedures, hold 5 days prior to all other procedure/surgery,  resume when hemodynamically stable     Warfarin - hold 7 days for all neurological procedures, hold 5 days prior to all other procedure/surgery and coordinate with [facility] ([phone]) INR testing and dose management.      Pradaxa/Xarelto/Eliquis/Savesya - hold 1 day prior to procedure for low bleeding risk procedure, 2 days for high bleeding risk procedure, or consider holding 3 days or longer for patients with reduced kidney function (CrCl <30mL/min) or spinal/cranial surgeries/procedures.      If they have a mechanical heart valve, please coordinate with [facility] ([phone]) the proper management of their anticoagulant in the periprocedural or perioperative period.      Some patients have higher risk for cardiovascular complications or holding medication. If our patient has had prior complications of holding antiplatelet or anticoagulants in the past and  we have seen them after these events, we have addressed these concerns with the patient. They are at an unknown degree of increased risk for recurrent complication.  You may hold anticoagulation/antiplatelets for the procedure or surgery if the benefits of the procedure or surgery outweigh this nonmodifiable risk.      If Sophia Leahy 1945 has new symptoms of heart failure decompensation, unstable arrythmia, or angina please reach out and we will assess the patient.      If you have other patient-specific concerns, please feel free to reach out to the patient's cardiologist directly at [phone].     Thank you,       [facility]

## 2023-07-11 NOTE — TELEPHONE ENCOUNTER
Phone Number Called: [phone]    Call outcome: Spoke to patient regarding message below.    Message:     Called pt to verify which facility she is having her colonoscopy. She verified that it is with [facility] with Dr. Sanchez. Will fax over the clearance.         Last OV: 07/10/2023  Proposed Surgery: Colonoscopy with deep propofol sedation  Surgery Date: 08/01/2023  Requesting Office Name: [facility]  Fax Number: [phone]  Preference of Location (default is surgery center unless specified by Cardiologist or [facility])  Prior Clearance Addressed: Yes      Surgical Clearance Letter Sent: YES   **Scan clearance request letter into [employer].**         ----- Message from Brii Yao P.A.-C. sent at 7/10/2023  3:35 PM PDT -----  OK to have colonoscopy without further testing in outpatient procedure setting. There is a [facility] [place], although patient said it was [facility], you might want to confirm with her.   ThanksCHRISTINE

## 2023-07-28 ENCOUNTER — OFFICE VISIT (OUTPATIENT)
Dept: URGENT CARE | Facility: CLINIC | Age: 78
End: 2023-07-28
Payer: MEDICARE

## 2023-07-28 VITALS
WEIGHT: 192 LBS | HEART RATE: 65 BPM | OXYGEN SATURATION: 99 % | DIASTOLIC BLOOD PRESSURE: 68 MMHG | SYSTOLIC BLOOD PRESSURE: 126 MMHG | RESPIRATION RATE: 16 BRPM | TEMPERATURE: 98.5 F | HEIGHT: 64 IN | BODY MASS INDEX: 32.78 KG/M2

## 2023-07-28 DIAGNOSIS — J01.90 ACUTE BACTERIAL SINUSITIS: ICD-10-CM

## 2023-07-28 DIAGNOSIS — B96.89 ACUTE BACTERIAL SINUSITIS: ICD-10-CM

## 2023-07-28 PROCEDURE — 3074F SYST BP LT 130 MM HG: CPT | Performed by: NURSE PRACTITIONER

## 2023-07-28 PROCEDURE — 99213 OFFICE O/P EST LOW 20 MIN: CPT | Performed by: NURSE PRACTITIONER

## 2023-07-28 PROCEDURE — 3078F DIAST BP <80 MM HG: CPT | Performed by: NURSE PRACTITIONER

## 2023-07-28 RX ORDER — AZITHROMYCIN 250 MG/1
TABLET, FILM COATED ORAL
Qty: 6 TABLET | Refills: 0 | Status: SHIPPED
Start: 2023-07-28 | End: 2023-09-27

## 2023-07-28 ASSESSMENT — FIBROSIS 4 INDEX: FIB4 SCORE: 1.22

## 2023-07-31 ASSESSMENT — ENCOUNTER SYMPTOMS
CARDIOVASCULAR NEGATIVE: 1
GASTROINTESTINAL NEGATIVE: 1
CHILLS: 0
SINUS PAIN: 1
FEVER: 0
HEADACHES: 1
WHEEZING: 0
SHORTNESS OF BREATH: 0
SORE THROAT: 1
EYES NEGATIVE: 1
RHINORRHEA: 0
MUSCULOSKELETAL NEGATIVE: 1
COUGH: 1

## 2023-07-31 NOTE — PROGRESS NOTES
"Subjective:   Sophia Leahy is a 78 y.o. female who presents for Cough (Pt states waking up with sore throat, allergies, sinus infection concern x 1 month)      Cough  This is a new problem. Episode onset: One month. The problem has been gradually worsening. The problem occurs constantly. The cough is Productive of sputum. Associated symptoms include ear congestion, headaches, nasal congestion and a sore throat. Pertinent negatives include no chills, fever, rhinorrhea, shortness of breath or wheezing. Nothing aggravates the symptoms. She has tried OTC cough suppressant, cool air and rest for the symptoms. The treatment provided mild relief. Her past medical history is significant for bronchitis. There is no history of asthma.       Review of Systems   Constitutional:  Positive for malaise/fatigue. Negative for chills and fever.   HENT:  Positive for congestion, sinus pain and sore throat. Negative for rhinorrhea.    Eyes: Negative.    Respiratory:  Positive for cough. Negative for shortness of breath and wheezing.    Cardiovascular: Negative.    Gastrointestinal: Negative.    Genitourinary: Negative.    Musculoskeletal: Negative.    Skin: Negative.    Neurological:  Positive for headaches.       Medications, Allergies, and current problem list reviewed today in Epic.     Objective:     /68 (BP Location: Right arm, Patient Position: Sitting, BP Cuff Size: Adult)   Pulse 65   Temp 36.9 °C (98.5 °F) (Temporal)   Resp 16   Ht 1.626 m (5' 4\")   Wt 87.1 kg (192 lb)   SpO2 99%     Physical Exam  Vitals reviewed.   Constitutional:       General: She is not in acute distress.     Appearance: Normal appearance. She is not ill-appearing.   HENT:      Head: Normocephalic and atraumatic.      Right Ear: Tympanic membrane, ear canal and external ear normal.      Left Ear: Tympanic membrane, ear canal and external ear normal.      Nose: Nasal tenderness and congestion present.      Right Nostril: Occlusion present. "      Left Nostril: Occlusion present.      Right Turbinates: Enlarged.      Left Turbinates: Enlarged.      Right Sinus: Maxillary sinus tenderness present.      Left Sinus: Maxillary sinus tenderness and frontal sinus tenderness present.      Mouth/Throat:      Mouth: Mucous membranes are moist.      Pharynx: Oropharynx is clear.   Eyes:      Extraocular Movements: Extraocular movements intact.      Conjunctiva/sclera: Conjunctivae normal.      Pupils: Pupils are equal, round, and reactive to light.   Cardiovascular:      Rate and Rhythm: Normal rate and regular rhythm.   Pulmonary:      Effort: Pulmonary effort is normal.      Breath sounds: Normal breath sounds.   Abdominal:      General: Abdomen is flat.      Palpations: Abdomen is soft.   Musculoskeletal:         General: Normal range of motion.      Cervical back: Normal range of motion and neck supple.   Skin:     General: Skin is warm and dry.      Capillary Refill: Capillary refill takes less than 2 seconds.   Neurological:      General: No focal deficit present.      Mental Status: She is alert.   Psychiatric:         Mood and Affect: Mood normal.         Behavior: Behavior normal.         Assessment/Plan:     Diagnosis and associated orders:     1. Acute bacterial sinusitis  azithromycin (ZITHROMAX) 250 MG Tab         Comments/MDM:     Due to duration of symptoms, sinus tenderness, and failure of OTC therapies- ABX was written to tx. For bacterial etiology for sinusitis.   Continue OTC supportive therapies- Flonase, OTC allergy meds, avoid night time dairy. Increase fluids. Humidification.   Patient given precautionary s/sx that mandate immediate follow up and evaluation in the ED. Advised of risks of not doing so.    DDX, Supportive care, and indications for immediate follow-up discussed with patient.    Instructed to return to clinic or nearest emergency department if we are not available for any change in condition, further concerns, or worsening of  symptoms.    The patient demonstrated a good understanding and agreed with the treatment plan           Differential diagnosis, natural history, supportive care, and indications for immediate follow-up discussed.    Advised the patient to follow-up with the primary care physician for recheck, reevaluation, and consideration of further management.    Please note that this dictation was created using voice recognition software. I have made a reasonable attempt to correct obvious errors, but I expect that there are errors of grammar and possibly content that I did not discover before finalizing the note.    This note was electronically signed by KWASI Billings

## 2023-09-18 ENCOUNTER — HOSPITAL ENCOUNTER (OUTPATIENT)
Dept: CARDIOLOGY | Facility: MEDICAL CENTER | Age: 78
End: 2023-09-18
Attending: PHYSICIAN ASSISTANT
Payer: MEDICARE

## 2023-09-18 DIAGNOSIS — R06.09 DYSPNEA ON EXERTION: ICD-10-CM

## 2023-09-18 DIAGNOSIS — I20.89 STABLE ANGINA (HCC): ICD-10-CM

## 2023-09-18 LAB
LV EJECT FRACT  99904: 70
LV EJECT FRACT MOD 2C 99903: 73.51
LV EJECT FRACT MOD 4C 99902: 64.91
LV EJECT FRACT MOD BP 99901: 69.61

## 2023-09-18 PROCEDURE — 93306 TTE W/DOPPLER COMPLETE: CPT

## 2023-09-18 PROCEDURE — 93306 TTE W/DOPPLER COMPLETE: CPT | Mod: 26 | Performed by: INTERNAL MEDICINE

## 2023-09-26 NOTE — PROGRESS NOTES
"Chief Complaint   Patient presents with    Hyperlipidemia     Follow up           Subjective:   Sophia Leahy is a 78 y.o. female who presents today for follow-up with her  Hi.     Previous patient of Dr. Rosen.  Last seen  for hypercholesterolemia with a calcium score of 34.     In May she was observed in the hospital for chest pain, jaw and arm pain occurring in the setting of stress. EKG was sinus rhythm with T wave abnormalities.  She was monitored on the telemetry unit without any evidence of arrhythmia.  She had 3 troponin test which remained less than 20.   She underwent exercise nuclear stress test which showed no evidence of inducible ischemia or jeopardized myocardium.      TTE performed 2023 shows normal LV systolic function, normal left ventricular size and wall thickness, normal regional wall motion, normal diastolic function.  No valvular abnormalities.    Sophia has not had any recurrence of the chest pressure.  Her stress level has improved.  Her and her  are packing up their house and planning to move.  She is not exercising other than walking around the block.    She is tolerating the ezetimibe/pravastatin combination well.    She says her blood pressure numbers are \"good\" at home.     Past Medical History:   Diagnosis Date    Anesthesia 2022    sister  from halothane    Bowel habit changes 2022    constipation    High cholesterol     Hypertension     Thyroid disorder     Urinary bladder disorder 2022    prolapsed bladder         No family history on file.      Social History     Tobacco Use    Smoking status: Never    Smokeless tobacco: Never   Vaping Use    Vaping Use: Never used   Substance Use Topics    Alcohol use: Yes     Comment: occasional:2-4 drinks/month    Drug use: No         Allergies   Allergen Reactions    Pcn [Penicillins] Hives and Swelling    Sulfa Drugs Nausea     As a young adult           Current Outpatient Medications " "  Medication Sig    escitalopram (LEXAPRO) 10 MG Tab     pravastatin (PRAVACHOL) 40 MG tablet     nitroglycerin (NITROSTAT) 0.4 MG SL Tab Place 1 Tablet under the tongue as needed for Chest Pain (may repeat after 5min, no more than 2 doses).    ezetimibe (ZETIA) 10 MG Tab Take 1 Tablet by mouth every day.    Coenzyme Q10 100 MG Cap Take 1 Capsule by mouth every day.    Aspirin-Acetaminophen-Caffeine (EXCEDRIN PO) Take 1 Tablet by mouth 2 times a day as needed (For headache).    Cyanocobalamin (B-12 PO) Take 1 Tablet by mouth every day.    lisinopril (PRINIVIL) 10 MG Tab 10 mg every day.    Ascorbic Acid (VITAMIN C PO) Take 1 Tablet by mouth every day.    Cholecalciferol (VITAMIN D3 PO) Take 1 Capful by mouth every day.    levothyroxine (SYNTHROID) 75 MCG Tab Take 75 mcg by mouth every 48 hours. Pt took on 5/29/2023,  pt also has an RX for 50MCG that she alternates with her 75MCG    levothyroxine (SYNTHROID) 50 MCG TABS Take 50 mcg by mouth every 48 hours. Pt took on 5/30/2023, pt also has an RX for 75MCG that she alternates with her 50MCG    L-LYSINE HCL PO Take 1 Tab by mouth every day.         Review of Systems   Respiratory:  Negative for cough and shortness of breath.    Cardiovascular:  Negative for chest pain, palpitations, orthopnea, leg swelling and PND.   Gastrointestinal:  Negative for nausea.   Neurological:  Negative for dizziness and loss of consciousness.          Objective:   /56 (BP Location: Left arm, Patient Position: Sitting)   Pulse 70   Resp 16   Ht 1.638 m (5' 4.5\")   Wt 87.5 kg (193 lb)   SpO2 97%  Body mass index is 32.62 kg/m².         Physical Exam  Vitals reviewed.   Constitutional:       Appearance: She is obese.   HENT:      Head: Normocephalic and atraumatic.   Cardiovascular:      Rate and Rhythm: Normal rate and regular rhythm.      Heart sounds: Murmur heard.   Pulmonary:      Effort: Pulmonary effort is normal. No respiratory distress.      Breath sounds: No rales. "   Musculoskeletal:      Right lower leg: No edema.      Left lower leg: No edema.   Skin:     General: Skin is warm and dry.   Neurological:      Mental Status: She is alert.      Gait: Gait normal.   Psychiatric:         Judgment: Judgment normal.          TTE 9/18/23  CONCLUSIONS   Normal left ventricular systolic function.   The left ventricular ejection fraction is visually estimated to be 65- 70%.   Normal diastolic function.   The right ventricle is normal in size and systolic function.   Unable to estimate right ventricular systolic pressure due to an   inadequate tricuspid regurgitant jet.   The left atrium is normal in size.   No significant valvular abnormalities.   Normal inferior vena cava size and inspiratory collapse.   Assessment:     1. Hypercholesteremia        2. Essential hypertension, benign        3. Stable angina        4. 10 year risk of MI or stroke 7.5% or greater             Medical Decision Making:  Today's Assessment / Plan:   Stable angina  - chest pain brought on by stress. No ischemia on exercise stress test with good exercise tolerance  - discussed possibility of small vessel disease causing symptoms,   - cont statin and ezetimibe. Not interested in further antianginals  - encouraged exercise  - use nitro PRN  - ER precautions     Hyperlipidemia  -LDL high despite pravastatin 40 mg which is the highest tolerated dose of statin.  Zetia 10 mg daily. LDL checked with PCP, goal of a 50% reduction given her high ASCVD risk score     The 10-year ASCVD risk score (Kourtney DK, et al., 2019) is: 27.3%      Return in about 1 year (around 9/27/2024) for follow up with me.

## 2023-09-27 ENCOUNTER — OFFICE VISIT (OUTPATIENT)
Dept: CARDIOLOGY | Facility: MEDICAL CENTER | Age: 78
End: 2023-09-27
Attending: PHYSICIAN ASSISTANT
Payer: MEDICARE

## 2023-09-27 VITALS
WEIGHT: 193 LBS | SYSTOLIC BLOOD PRESSURE: 136 MMHG | RESPIRATION RATE: 16 BRPM | DIASTOLIC BLOOD PRESSURE: 56 MMHG | HEIGHT: 65 IN | BODY MASS INDEX: 32.15 KG/M2 | HEART RATE: 70 BPM | OXYGEN SATURATION: 97 %

## 2023-09-27 DIAGNOSIS — E78.00 HYPERCHOLESTEREMIA: ICD-10-CM

## 2023-09-27 DIAGNOSIS — Z91.89 10 YEAR RISK OF MI OR STROKE 7.5% OR GREATER: ICD-10-CM

## 2023-09-27 DIAGNOSIS — I10 ESSENTIAL HYPERTENSION, BENIGN: ICD-10-CM

## 2023-09-27 DIAGNOSIS — I20.89 STABLE ANGINA (HCC): ICD-10-CM

## 2023-09-27 PROCEDURE — 3075F SYST BP GE 130 - 139MM HG: CPT | Performed by: PHYSICIAN ASSISTANT

## 2023-09-27 PROCEDURE — 3078F DIAST BP <80 MM HG: CPT | Performed by: PHYSICIAN ASSISTANT

## 2023-09-27 PROCEDURE — 99214 OFFICE O/P EST MOD 30 MIN: CPT | Performed by: PHYSICIAN ASSISTANT

## 2023-09-27 PROCEDURE — 99213 OFFICE O/P EST LOW 20 MIN: CPT | Performed by: PHYSICIAN ASSISTANT

## 2023-09-27 ASSESSMENT — ENCOUNTER SYMPTOMS
NAUSEA: 0
DIZZINESS: 0
LOSS OF CONSCIOUSNESS: 0
COUGH: 0
PND: 0
SHORTNESS OF BREATH: 0
ORTHOPNEA: 0
PALPITATIONS: 0

## 2023-09-27 ASSESSMENT — FIBROSIS 4 INDEX: FIB4 SCORE: 1.22

## 2023-09-27 NOTE — LETTER
"     [facility] by [facility]   [address], [address]  [place] [address]  Phone: [phone]  Fax: [phone]              Sophia Leahy  1945    Encounter Date: 2023    Brii Yao P.A.-C.          PROGRESS NOTE:  Chief Complaint   Patient presents with   • Hyperlipidemia     Follow up           Subjective:   Sophia Leahy is a 78 y.o. female who presents today for follow-up with her  Hi.     Previous patient of Dr. Rosen.  Last seen  for hypercholesterolemia with a calcium score of 34.     In May she was observed in the hospital for chest pain, jaw and arm pain occurring in the setting of stress. EKG was sinus rhythm with T wave abnormalities.  She was monitored on the telemetry unit without any evidence of arrhythmia.  She had 3 troponin test which remained less than 20.   She underwent exercise nuclear stress test which showed no evidence of inducible ischemia or jeopardized myocardium.      TTE performed 2023 shows normal LV systolic function, normal left ventricular size and wall thickness, normal regional wall motion, normal diastolic function.  No valvular abnormalities.    Sophia has not had any recurrence of the chest pressure.  Her stress level has improved.  Her and her  are packing up their house and planning to move.  She is not exercising other than walking around the block.    She is tolerating the ezetimibe/pravastatin combination well.    She says her blood pressure numbers are \"good\" at home.     Past Medical History:   Diagnosis Date   • Anesthesia 2022    sister  from halothane   • Bowel habit changes 2022    constipation   • High cholesterol    • Hypertension    • Thyroid disorder    • Urinary bladder disorder 2022    prolapsed bladder         No family history on file.      Social History     Tobacco Use   • Smoking status: Never   • Smokeless " "tobacco: Never   Vaping Use   • Vaping Use: Never used   Substance Use Topics   • Alcohol use: Yes     Comment: occasional:2-4 drinks/month   • Drug use: No         Allergies   Allergen Reactions   • Pcn [Penicillins] Hives and Swelling   • Sulfa Drugs Nausea     As a young adult           Current Outpatient Medications   Medication Sig   • escitalopram (LEXAPRO) 10 MG Tab    • pravastatin (PRAVACHOL) 40 MG tablet    • nitroglycerin (NITROSTAT) 0.4 MG SL Tab Place 1 Tablet under the tongue as needed for Chest Pain (may repeat after 5min, no more than 2 doses).   • ezetimibe (ZETIA) 10 MG Tab Take 1 Tablet by mouth every day.   • Coenzyme Q10 100 MG Cap Take 1 Capsule by mouth every day.   • Aspirin-Acetaminophen-Caffeine (EXCEDRIN PO) Take 1 Tablet by mouth 2 times a day as needed (For headache).   • Cyanocobalamin (B-12 PO) Take 1 Tablet by mouth every day.   • lisinopril (PRINIVIL) 10 MG Tab 10 mg every day.   • Ascorbic Acid (VITAMIN C PO) Take 1 Tablet by mouth every day.   • Cholecalciferol (VITAMIN D3 PO) Take 1 Capful by mouth every day.   • levothyroxine (SYNTHROID) 75 MCG Tab Take 75 mcg by mouth every 48 hours. Pt took on 5/29/2023,  pt also has an RX for 50MCG that she alternates with her 75MCG   • levothyroxine (SYNTHROID) 50 MCG TABS Take 50 mcg by mouth every 48 hours. Pt took on 5/30/2023, pt also has an RX for 75MCG that she alternates with her 50MCG   • L-LYSINE HCL PO Take 1 Tab by mouth every day.         Review of Systems   Respiratory:  Negative for cough and shortness of breath.    Cardiovascular:  Negative for chest pain, palpitations, orthopnea, leg swelling and PND.   Gastrointestinal:  Negative for nausea.   Neurological:  Negative for dizziness and loss of consciousness.          Objective:   /56 (BP Location: Left arm, Patient Position: Sitting)   Pulse 70   Resp 16   Ht 1.638 m (5' 4.5\")   Wt 87.5 kg (193 lb)   SpO2 97%  Body mass index is 32.62 kg/m².         Physical " Exam  Vitals reviewed.   Constitutional:       Appearance: She is obese.   HENT:      Head: Normocephalic and atraumatic.   Cardiovascular:      Rate and Rhythm: Normal rate and regular rhythm.      Heart sounds: Murmur heard.   Pulmonary:      Effort: Pulmonary effort is normal. No respiratory distress.      Breath sounds: No rales.   Musculoskeletal:      Right lower leg: No edema.      Left lower leg: No edema.   Skin:     General: Skin is warm and dry.   Neurological:      Mental Status: She is alert.      Gait: Gait normal.   Psychiatric:         Judgment: Judgment normal.          TTE 9/18/23  CONCLUSIONS   Normal left ventricular systolic function.   The left ventricular ejection fraction is visually estimated to be 65- 70%.   Normal diastolic function.   The right ventricle is normal in size and systolic function.   Unable to estimate right ventricular systolic pressure due to an   inadequate tricuspid regurgitant jet.   The left atrium is normal in size.   No significant valvular abnormalities.   Normal inferior vena cava size and inspiratory collapse.   Assessment:     1. Hypercholesteremia        2. Essential hypertension, benign        3. Stable angina        4. 10 year risk of MI or stroke 7.5% or greater             Medical Decision Making:  Today's Assessment / Plan:   Stable angina  - chest pain brought on by stress. No ischemia on exercise stress test with good exercise tolerance  - discussed possibility of small vessel disease causing symptoms,   - cont statin and ezetimibe. Not interested in further antianginals  - encouraged exercise  - use nitro PRN  - ER precautions     Hyperlipidemia  -LDL high despite pravastatin 40 mg which is the highest tolerated dose of statin.  Zetia 10 mg daily. LDL checked with PCP, goal of a 50% reduction given her high ASCVD risk score     The 10-year ASCVD risk score (Kourtney LORENZO, et al., 2019) is: 27.3%      Return in about 1 year (around 9/27/2024) for follow up  with me.      Valeria Bautista M.D.  [address] [address]  Via Fax: [phone]

## 2023-11-29 ENCOUNTER — PATIENT MESSAGE (OUTPATIENT)
Dept: HEALTH INFORMATION MANAGEMENT | Facility: OTHER | Age: 78
End: 2023-11-29

## 2024-09-13 ENCOUNTER — HOSPITAL ENCOUNTER (OUTPATIENT)
Dept: LAB | Facility: MEDICAL CENTER | Age: 79
End: 2024-09-13
Attending: FAMILY MEDICINE
Payer: MEDICARE

## 2024-09-13 LAB
ALBUMIN SERPL BCP-MCNC: 4.2 G/DL (ref 3.2–4.9)
ALBUMIN/GLOB SERPL: 1.4 G/DL
ALP SERPL-CCNC: 65 U/L (ref 30–99)
ALT SERPL-CCNC: 16 U/L (ref 2–50)
ANION GAP SERPL CALC-SCNC: 14 MMOL/L (ref 7–16)
AST SERPL-CCNC: 16 U/L (ref 12–45)
BASOPHILS # BLD AUTO: 0.6 % (ref 0–1.8)
BASOPHILS # BLD: 0.03 K/UL (ref 0–0.12)
BILIRUB SERPL-MCNC: 0.6 MG/DL (ref 0.1–1.5)
BUN SERPL-MCNC: 18 MG/DL (ref 8–22)
CALCIUM ALBUM COR SERPL-MCNC: 8.7 MG/DL (ref 8.5–10.5)
CALCIUM SERPL-MCNC: 8.9 MG/DL (ref 8.4–10.2)
CHLORIDE SERPL-SCNC: 103 MMOL/L (ref 96–112)
CHOLEST SERPL-MCNC: 309 MG/DL (ref 100–199)
CO2 SERPL-SCNC: 24 MMOL/L (ref 20–33)
CREAT SERPL-MCNC: 0.81 MG/DL (ref 0.5–1.4)
EOSINOPHIL # BLD AUTO: 0.06 K/UL (ref 0–0.51)
EOSINOPHIL NFR BLD: 1.2 % (ref 0–6.9)
ERYTHROCYTE [DISTWIDTH] IN BLOOD BY AUTOMATED COUNT: 49.1 FL (ref 35.9–50)
EST. AVERAGE GLUCOSE BLD GHB EST-MCNC: 111 MG/DL
FASTING STATUS PATIENT QL REPORTED: NORMAL
GFR SERPLBLD CREATININE-BSD FMLA CKD-EPI: 73 ML/MIN/1.73 M 2
GLOBULIN SER CALC-MCNC: 2.9 G/DL (ref 1.9–3.5)
GLUCOSE SERPL-MCNC: 102 MG/DL (ref 65–99)
HBA1C MFR BLD: 5.5 % (ref 4–5.6)
HCT VFR BLD AUTO: 45.2 % (ref 37–47)
HDLC SERPL-MCNC: 61 MG/DL
HGB BLD-MCNC: 14.8 G/DL (ref 12–16)
IMM GRANULOCYTES # BLD AUTO: 0.01 K/UL (ref 0–0.11)
IMM GRANULOCYTES NFR BLD AUTO: 0.2 % (ref 0–0.9)
LDLC SERPL CALC-MCNC: 223 MG/DL
LYMPHOCYTES # BLD AUTO: 1.38 K/UL (ref 1–4.8)
LYMPHOCYTES NFR BLD: 28.6 % (ref 22–41)
MCH RBC QN AUTO: 33.1 PG (ref 27–33)
MCHC RBC AUTO-ENTMCNC: 32.7 G/DL (ref 32.2–35.5)
MCV RBC AUTO: 101.1 FL (ref 81.4–97.8)
MONOCYTES # BLD AUTO: 0.44 K/UL (ref 0–0.85)
MONOCYTES NFR BLD AUTO: 9.1 % (ref 0–13.4)
NEUTROPHILS # BLD AUTO: 2.91 K/UL (ref 1.82–7.42)
NEUTROPHILS NFR BLD: 60.3 % (ref 44–72)
NRBC # BLD AUTO: 0 K/UL
NRBC BLD-RTO: 0 /100 WBC (ref 0–0.2)
PLATELET # BLD AUTO: 318 K/UL (ref 164–446)
PMV BLD AUTO: 10.1 FL (ref 9–12.9)
POTASSIUM SERPL-SCNC: 4.2 MMOL/L (ref 3.6–5.5)
PROT SERPL-MCNC: 7.1 G/DL (ref 6–8.2)
RBC # BLD AUTO: 4.47 M/UL (ref 4.2–5.4)
SODIUM SERPL-SCNC: 141 MMOL/L (ref 135–145)
T3FREE SERPL-MCNC: 2.42 PG/ML (ref 2–4.4)
T4 FREE SERPL-MCNC: 1.04 NG/DL (ref 0.93–1.7)
TRIGL SERPL-MCNC: 123 MG/DL (ref 0–149)
TSH SERPL DL<=0.005 MIU/L-ACNC: 4.61 UIU/ML (ref 0.38–5.33)
WBC # BLD AUTO: 4.8 K/UL (ref 4.8–10.8)

## 2024-09-13 PROCEDURE — 80061 LIPID PANEL: CPT

## 2024-09-13 PROCEDURE — 84443 ASSAY THYROID STIM HORMONE: CPT

## 2024-09-13 PROCEDURE — 84439 ASSAY OF FREE THYROXINE: CPT

## 2024-09-13 PROCEDURE — 36415 COLL VENOUS BLD VENIPUNCTURE: CPT

## 2024-09-13 PROCEDURE — 83036 HEMOGLOBIN GLYCOSYLATED A1C: CPT | Mod: GA

## 2024-09-13 PROCEDURE — 85025 COMPLETE CBC W/AUTO DIFF WBC: CPT

## 2024-09-13 PROCEDURE — 80053 COMPREHEN METABOLIC PANEL: CPT

## 2024-09-13 PROCEDURE — 84481 FREE ASSAY (FT-3): CPT

## 2024-10-17 ENCOUNTER — APPOINTMENT (OUTPATIENT)
Dept: RADIOLOGY | Facility: MEDICAL CENTER | Age: 79
End: 2024-10-17
Attending: FAMILY MEDICINE
Payer: MEDICARE

## 2024-10-17 DIAGNOSIS — Z12.31 VISIT FOR SCREENING MAMMOGRAM: ICD-10-CM

## 2024-10-17 PROCEDURE — 77067 SCR MAMMO BI INCL CAD: CPT

## 2024-11-26 ENCOUNTER — HOSPITAL ENCOUNTER (OUTPATIENT)
Dept: RADIOLOGY | Facility: MEDICAL CENTER | Age: 79
End: 2024-11-26
Attending: FAMILY MEDICINE
Payer: MEDICARE

## 2024-11-26 DIAGNOSIS — N95.9 MENOPAUSAL PROBLEM: ICD-10-CM

## 2024-11-26 PROCEDURE — 77080 DXA BONE DENSITY AXIAL: CPT

## (undated) DEVICE — SENSOR SPO2 NEO LNCS ADHESIVE (20/BX) SEE USER NOTES

## (undated) DEVICE — LACTATED RINGERS INJ 1000 ML - (14EA/CA 60CA/PF)

## (undated) DEVICE — Device

## (undated) DEVICE — MASK ANESTHESIA ADULT  - (100/CA)

## (undated) DEVICE — SET EXTENSION WITH 2 PORTS (48EA/CA) ***PART #2C8610 IS A SUBSTITUTE*****

## (undated) DEVICE — KIT ANESTHESIA W/CIRCUIT & 3/LT BAG W/FILTER (20EA/CA)

## (undated) DEVICE — TRAY CATHETER FOLEY URINE METER W/STATLOCK 350ML (10EA/CA)

## (undated) DEVICE — TUBE CONNECT SUCTION CLEAR 120 X 1/4" (50EA/CA)"

## (undated) DEVICE — DRAPE VAGINAL BIB W/ POUCH (10EA/CA)

## (undated) DEVICE — SET LEADWIRE 5 LEAD BEDSIDE DISPOSABLE ECG (1SET OF 5/EA)

## (undated) DEVICE — SUCTION INSTRUMENT YANKAUER BULBOUS TIP W/O VENT (50EA/CA)

## (undated) DEVICE — TOWEL STOP TIMEOUT SAFETY FLAG (40EA/CA)

## (undated) DEVICE — GLOVE BIOGEL PI ORTHO SZ 6 SURGICAL PF LF (40PR/BX)

## (undated) DEVICE — ELECTRODE DUAL RETURN W/ CORD - (50/PK)

## (undated) DEVICE — SUTURE GENERAL

## (undated) DEVICE — NEPTUNE 4 PORT MANIFOLD - (20/PK)

## (undated) DEVICE — SODIUM CHL IRRIGATION 0.9% 1000ML (12EA/CA)

## (undated) DEVICE — CANISTER SUCTION 3000ML MECHANICAL FILTER AUTO SHUTOFF MEDI-VAC NONSTERILE LF DISP  (40EA/CA)

## (undated) DEVICE — HEAD HOLDER JUNIOR/ADULT

## (undated) DEVICE — ELECTRODE 850 FOAM ADHESIVE - HYDROGEL RADIOTRNSPRNT (50/PK)

## (undated) DEVICE — SUTURE 2-0 VICRYL PLUS SH - 8 X 18 INCH (12/BX)

## (undated) DEVICE — PAD SANITARY 11IN MAXI IND WRAPPED  (12EA/PK 24PK/CA)

## (undated) DEVICE — PROTECTOR ULNA NERVE - (36PR/CA)

## (undated) DEVICE — BRIEF STRETCH MATERNITY M/L - FITS 20-60IN (5EA/BG 20BG/CA)

## (undated) DEVICE — TUBING CLEARLINK DUO-VENT - C-FLO (48EA/CA)

## (undated) DEVICE — RINGDISP RETRACTOR LONESTAR

## (undated) DEVICE — SET IRRIGATION CYSTOSCOPY TUBE L80 IN (20EA/CA)

## (undated) DEVICE — WATER IRRIG. STER 3000 ML - (4/CA)

## (undated) DEVICE — SUTURE 3-0 VICRYL PLUS SH - 27 INCH (36/BX)

## (undated) DEVICE — TRAY SRGPRP PVP IOD WT PRP - (20/CA)

## (undated) DEVICE — SLEEVE VASO CALF MED - (10PR/CA)

## (undated) DEVICE — GOWN WARMING STANDARD FLEX - (30/CA)

## (undated) DEVICE — SLEEVE, VASO, THIGH, MED